# Patient Record
Sex: MALE | Race: BLACK OR AFRICAN AMERICAN | NOT HISPANIC OR LATINO | Employment: OTHER | URBAN - METROPOLITAN AREA
[De-identification: names, ages, dates, MRNs, and addresses within clinical notes are randomized per-mention and may not be internally consistent; named-entity substitution may affect disease eponyms.]

---

## 2017-01-28 ENCOUNTER — HOSPITAL ENCOUNTER (EMERGENCY)
Facility: HOSPITAL | Age: 32
Discharge: HOME | End: 2017-01-28
Attending: EMERGENCY MEDICINE
Payer: OTHER GOVERNMENT

## 2017-01-28 VITALS
TEMPERATURE: 99 F | DIASTOLIC BLOOD PRESSURE: 76 MMHG | SYSTOLIC BLOOD PRESSURE: 126 MMHG | WEIGHT: 190 LBS | OXYGEN SATURATION: 100 % | BODY MASS INDEX: 26.6 KG/M2 | HEART RATE: 82 BPM | HEIGHT: 71 IN | RESPIRATION RATE: 10 BRPM

## 2017-01-28 DIAGNOSIS — Z76.0 MEDICATION REFILL: ICD-10-CM

## 2017-01-28 DIAGNOSIS — B02.29 POSTHERPETIC NEURALGIA: Primary | ICD-10-CM

## 2017-01-28 PROCEDURE — 99282 EMERGENCY DEPT VISIT SF MDM: CPT

## 2017-01-28 RX ORDER — GABAPENTIN 300 MG/1
300 CAPSULE ORAL 3 TIMES DAILY
Qty: 90 CAPSULE | Refills: 0 | Status: SHIPPED | OUTPATIENT
Start: 2017-01-28 | End: 2017-02-17

## 2017-01-28 RX ORDER — VALACYCLOVIR HYDROCHLORIDE 500 MG/1
500 TABLET, FILM COATED ORAL DAILY
Qty: 30 TABLET | Refills: 0 | Status: SHIPPED | OUTPATIENT
Start: 2017-01-28 | End: 2017-02-17

## 2017-01-28 NOTE — ED AVS SNAPSHOT
[facility] [address]  [place] [address]               Junito Tian   2017  2:12 PM   ED    Description:  Male : 1985   Department:  [facility]           Your Care was Coordinated By:     Provider Role From To    Pollo Max MD Attending Provider 17 0245 --    Sakina Porras PA-C Physician Assistant 17 [phone] --      Reason for Visit     Medication Refill           Diagnoses this Visit        Comments    Postherpetic neuralgia    -  Primary     Medication refill           ED Disposition     None           To Do List           Follow-up Information     Follow up with Tawana Spencer MD.    Specialty:  Infectious Diseases    Why:  as scheduled in 2 weeks    Contact information:    [address]  [place] [address] [phone]         These Medications        Disp Refills Start End    valacyclovir (VALTREX) 500 MG tablet 30 tablet 0 2017    Take 1 tablet (500 mg total) by mouth once daily. - Oral    gabapentin (NEURONTIN) 300 MG capsule 90 capsule 0 2017    Take 1 capsule (300 mg total) by mouth 3 (three) times daily. - Oral      [facility]     [facility] -  Assistance  Registered nurses in the [facility] provide clinical advisement, health education, appointment booking, and other advisory services.  Call for this free service at [phone].             Medications           Message regarding Medications     Verify the changes and/or additions to your medication regime listed below are the same as discussed with your clinician today.  If any of these changes or additions are incorrect, please notify your healthcare provider.        START taking these NEW medications        Refills    valacyclovir (VALTREX) 500 MG tablet 0    Sig: Take 1 tablet (500 mg total) by mouth once daily.    Class: Print    Route: Oral    gabapentin  "(NEURONTIN) 300 MG capsule 0    Sig: Take 1 capsule (300 mg total) by mouth 3 (three) times daily.    Class: Print    Route: Oral           Verify that the below list of medications is an accurate representation of the medications you are currently taking.  If none reported, the list may be blank. If incorrect, please contact your healthcare provider. Carry this list with you in case of emergency.           Current Medications     biotin 1 mg tablet Take 1,000 mcg by mouth once daily.    fish oil-omega-3 fatty acids 300-1,000 mg capsule Take 2 g by mouth once daily.    gabapentin (NEURONTIN) 300 MG capsule Take 1 capsule (300 mg total) by mouth 3 (three) times daily.    valacyclovir (VALTREX) 500 MG tablet Take 1 tablet (500 mg total) by mouth once daily.    zinc gluconate 50 mg tablet Take 50 mg by mouth once daily.           Clinical Reference Information           Your Vitals Were     BP Pulse Temp Resp Height Weight    126/76 (BP Location: Right arm, Patient Position: Sitting) 82 99 °F (37.2 °C) (Oral) 10 5' 11" (1.803 m) 86.2 kg (190 lb)    SpO2 BMI             100% 26.5 kg/m2         Allergies as of 1/28/2017     No Known Allergies      Immunizations Administered on Date of Encounter - 1/28/2017     None      ED Micro, Lab, POCT     None      ED Imaging Orders     None      Smoking Cessation     If you would like to quit smoking:   You may be eligible for free services if you are a [place] resident and started smoking cigarettes before September 1, 1988.  Call the Smoking Cessation Trust ([facility]) toll free at (369) 415-3190 or (910) 516-0528.   Call 3-800-QUIT-NOW if you do not meet the above criteria.             [facility] complies with applicable Federal civil rights laws and does not discriminate on the basis of race, color, national origin, age, disability, or sex.        Language Assistance Services     ATTENTION: Language assistance services are available, free of charge. Please " call [phone].      ATENCIÓN: Si habla español, tiene a driscoll disposición servicios gratuitos de asistencia lingüística. Llame al [phone].     CHÚ Ý: N?u b?n nói Ti?ng Vi?t, có các d?ch v? h? tr? ngôn ng? mi?n phí dành cho b?n. G?i s? [phone].

## 2017-01-28 NOTE — ED NOTES
Pt requesting refill of valtrex, denies s/s at this time. Has not followed up with PCP since last ED visit.

## 2017-01-28 NOTE — ED PROVIDER NOTES
Encounter Date: 1/28/2017       History     Chief Complaint   Patient presents with    Medication Refill     valacyclovir 500 mg      Review of patient's allergies indicates:  No Known Allergies  HPI Comments: Junito Tian is a 31 y.o. male presenting for refills of his Valtrex and Neurontin.  He has been taking these medications for the last several weeks since developing postherpetic neuralgia.  His symptoms return, if he doesn't take the medication.  No fever, no chills.  He has an appointment with infectious disease in a couple of weeks.      The history is provided by the patient.     Past Medical History   Diagnosis Date    Achilles tendon rupture      LEFT     Past Medical History Pertinent Negatives   Diagnosis Date Noted    Blood transfusion 7/26/2013     History reviewed. No pertinent past surgical history.  History reviewed. No pertinent family history.  Social History   Substance Use Topics    Smoking status: Current Some Day Smoker    Smokeless tobacco: Never Used      Comment: CIGARS    Alcohol use Yes      Comment: SOCIAL     Review of Systems   Constitutional: Negative for chills and fever.   Musculoskeletal: Negative for arthralgias, back pain, joint swelling, myalgias, neck pain and neck stiffness.   Skin: Negative for color change, pallor, rash and wound.   Neurological: Negative for weakness and numbness.   Hematological: Does not bruise/bleed easily.       Physical Exam   Initial Vitals   BP Pulse Resp Temp SpO2   01/28/17 1409 01/28/17 1409 01/28/17 1409 01/28/17 1409 01/28/17 1409   126/76 82 10 99 °F (37.2 °C) 100 %     Physical Exam    Nursing note and vitals reviewed.  Constitutional: He appears well-developed and well-nourished. He is not diaphoretic. No distress.   HENT:   Head: Normocephalic and atraumatic.   Cardiovascular: Intact distal pulses.   Musculoskeletal: Normal range of motion. He exhibits no edema or tenderness.   Neurological: He is alert and oriented to person, place,  and time. He has normal strength. No sensory deficit.   Skin: Skin is warm and dry. No rash and no abscess noted. No erythema.         ED Course   Procedures  Labs Reviewed - No data to display          Medical Decision Making:   History:   Old Medical Records: I decided to obtain old medical records.  Old Records Summarized: other records.       <> Summary of Records: Recent labs here in the ED were negative.         APC / Resident Notes:   He is given a refill on Valtrex 500 mg daily and Neurontin 300 mg TID.  He will follow-up with infectious disease as scheduled.  He is given specific return precautions.               ED Course     Clinical Impression:   The primary encounter diagnosis was Postherpetic neuralgia. A diagnosis of Medication refill was also pertinent to this visit.          Sakina Porras PA-C  01/28/17 1545

## 2017-02-17 ENCOUNTER — HOSPITAL ENCOUNTER (EMERGENCY)
Facility: HOSPITAL | Age: 32
Discharge: HOME | End: 2017-02-17
Attending: EMERGENCY MEDICINE
Payer: OTHER GOVERNMENT

## 2017-02-17 VITALS
BODY MASS INDEX: 26.5 KG/M2 | OXYGEN SATURATION: 99 % | DIASTOLIC BLOOD PRESSURE: 76 MMHG | TEMPERATURE: 98 F | HEART RATE: 67 BPM | RESPIRATION RATE: 16 BRPM | WEIGHT: 190 LBS | SYSTOLIC BLOOD PRESSURE: 119 MMHG

## 2017-02-17 DIAGNOSIS — J06.9 UPPER RESPIRATORY TRACT INFECTION, UNSPECIFIED TYPE: Primary | ICD-10-CM

## 2017-02-17 LAB
BILIRUB UR QL STRIP: NEGATIVE
CLARITY UR: CLEAR
COLOR UR: YELLOW
GLUCOSE UR QL STRIP: NEGATIVE
HGB UR QL STRIP: NEGATIVE
KETONES UR QL STRIP: NEGATIVE
LEUKOCYTE ESTERASE UR QL STRIP: NEGATIVE
NITRITE UR QL STRIP: NEGATIVE
PH UR STRIP: 6 [PH] (ref 5–8)
PROT UR QL STRIP: NEGATIVE
SP GR UR STRIP: 1.02 (ref 1–1.03)
URN SPEC COLLECT METH UR: NORMAL
UROBILINOGEN UR STRIP-ACNC: NEGATIVE EU/DL

## 2017-02-17 PROCEDURE — 81003 URINALYSIS AUTO W/O SCOPE: CPT

## 2017-02-17 PROCEDURE — 99283 EMERGENCY DEPT VISIT LOW MDM: CPT

## 2017-02-17 RX ORDER — AMOXICILLIN AND CLAVULANATE POTASSIUM 875; 125 MG/1; MG/1
1 TABLET, FILM COATED ORAL 2 TIMES DAILY
Qty: 20 TABLET | Refills: 0 | Status: SHIPPED | OUTPATIENT
Start: 2017-02-17 | End: 2017-02-27

## 2017-02-17 RX ORDER — FLUTICASONE PROPIONATE 50 MCG
1 SPRAY, SUSPENSION (ML) NASAL 2 TIMES DAILY PRN
Qty: 15 G | Refills: 0 | Status: SHIPPED | OUTPATIENT
Start: 2017-02-17 | End: 2017-09-05

## 2017-02-17 NOTE — ED PROVIDER NOTES
Encounter Date: 2/17/2017       History     Chief Complaint   Patient presents with    Headache    Otalgia    Dysuria     Review of patient's allergies indicates:  No Known Allergies  HPI Comments: Patient is a 31 year old male with complaint of headache, nasal congestion and rhinorrhea for six months. Patigogo denied PMH. He reports he was previously diagnosed with shingles when the symptoms first started. He has since been seen by opthalmology who reported resolution of his symptoms. He has seen his primary care provider who has referred him to ID and ENT. He states he saw Dr. Spencer who recommended he take zyrtec daily. He states the zyrtec results in resolution of his symptoms but he does not want to take medications daily. He has an appointment with ENT in one month. He denied any changes in his symptoms or worsening in severity. He just reports constant, mild symptoms. He denied fever, chills, nausea, vomiting, abdominal pain, recent travel or sick contact.     The history is provided by the patient.     Past Medical History   Diagnosis Date    Achilles tendon rupture      LEFT     Past Medical History Pertinent Negatives   Diagnosis Date Noted    Blood transfusion 7/26/2013     History reviewed. No pertinent past surgical history.  History reviewed. No pertinent family history.  Social History   Substance Use Topics    Smoking status: Current Some Day Smoker    Smokeless tobacco: Never Used      Comment: CIGARS    Alcohol use Yes      Comment: SOCIAL     Review of Systems   Constitutional: Negative for chills and fever.   HENT: Positive for congestion, ear pain, rhinorrhea and sinus pressure. Negative for ear discharge and sore throat.    Respiratory: Negative for cough, shortness of breath and wheezing.    Cardiovascular: Negative for chest pain.   Gastrointestinal: Negative for abdominal pain, diarrhea, nausea and vomiting.   Genitourinary: Negative for dysuria and urgency.   Musculoskeletal:  Negative for back pain, neck pain and neck stiffness.   Skin: Negative for rash.   Neurological: Positive for dizziness and headaches. Negative for seizures, syncope and weakness.   Hematological: Does not bruise/bleed easily.       Physical Exam   Initial Vitals   BP Pulse Resp Temp SpO2   02/17/17 1303 02/17/17 1303 02/17/17 1303 02/17/17 1303 02/17/17 1303   119/76 67 16 97.7 °F (36.5 °C) 99 %     Physical Exam    Nursing note and vitals reviewed.  Constitutional: He appears well-developed and well-nourished.   HENT:   Head: Normocephalic and atraumatic.   Right Ear: Tympanic membrane, external ear and ear canal normal.   Left Ear: Tympanic membrane, external ear and ear canal normal.   Nose: Nose normal. Right sinus exhibits no maxillary sinus tenderness and no frontal sinus tenderness. Left sinus exhibits no maxillary sinus tenderness and no frontal sinus tenderness.   Mouth/Throat: Uvula is midline, oropharynx is clear and moist and mucous membranes are normal. No posterior oropharyngeal edema or posterior oropharyngeal erythema.   Eyes: Conjunctivae and lids are normal. Right eye exhibits no discharge. Left eye exhibits no discharge. No scleral icterus.   Neck: Normal range of motion and full passive range of motion without pain. Neck supple.   Cardiovascular: Normal rate, regular rhythm and normal heart sounds. Exam reveals no gallop and no friction rub.    No murmur heard.  Pulmonary/Chest: Effort normal and breath sounds normal. He has no decreased breath sounds. He has no wheezes. He has no rhonchi. He has no rales.   Abdominal: Soft. Normal appearance and bowel sounds are normal. He exhibits no distension. There is no tenderness. There is no rigidity, no rebound, no guarding and no CVA tenderness.   Musculoskeletal: Normal range of motion. He exhibits no tenderness.   Neurological: He is oriented to person, place, and time.   Skin: Skin is warm and dry.         ED Course   Procedures  Labs Reviewed    URINALYSIS             Medical Decision Making:   History:   Old Medical Records: I decided to obtain old medical records.       APC / Resident Notes:   Patient is a 31-year-old male with complaint of chronic upper respiratory symptoms for 6 months.  He states that Zyrtec relieves his symptoms but is concerned about having to take medications daily.  He has seen infectious disease, Dr. abelardo leiva, and is to see ENT next month.  He denied any worsening of his symptoms.  He denied fever.  He is concerned that he needs antibiotics to clear up his possible respiratory infection.  He is well-appearing.  Vital signs are stable.  Physical exam is unremarkable.  Will prescribe Flonase and do a trial of Augmentin.  He is to follow up with ENT as planned. Discussed results with patient. Return precautions given. Patient is to follow up with their primary care provider. Case was discussed with Dr. Rivera who has evaluated the patient and is in agreement with the plan of care. All questions answered.                 ED Course     Clinical Impression:   The encounter diagnosis was Upper respiratory tract infection, unspecified type.    Disposition:   Disposition: Discharged  Condition: Stable       Jennifer Cisneros PA-C  02/17/17 4276

## 2017-02-17 NOTE — ED AVS SNAPSHOT
[facility] [address] [place] [address]               Junito Tian   2017  1:39 PM   ED    Description:  Male : 1985   Department:  [facility]           Your Care was Coordinated By:     Provider Role From To    Robert Rivera MD Attending Provider 17 [phone] --    Jennifer Cisneros PA-C Physician Assistant 17 [phone] --      Reason for Visit     Headache     Otalgia     Dysuria           Diagnoses this Visit        Comments    Upper respiratory tract infection, unspecified type    -  Primary       ED Disposition     ED Disposition Condition Comment    Discharge             To Do List           Follow-up Information     Follow up with Barbara L Morgan Schoen, FNP In 1 week.    Specialty:  Family Medicine    Contact information:    [address] [facility] [id number] [phone]          Schedule an appointment as soon as possible for a visit with CHELE Ross MD.    Specialty:  Otolaryngology    Contact information:    [address]  [facility] [address] [phone]          Follow up with [facility].    Specialty:  Emergency Medicine    Why:  If symptoms worsen    Contact information:    [address] [place] [address] [phone]       These Medications        Disp Refills Start End    fluticasone (FLONASE) 50 mcg/actuation nasal spray 15 g 0 2017     1 spray by Each Nare route 2 (two) times daily as needed for Allergies. - Each Nare    amoxicillin-clavulanate 875-125mg (AUGMENTIN) 875-125 mg per tablet 20 tablet 0 2017    Take 1 tablet by mouth 2 (two) times daily. - Oral      [facility]     [facility] -  Assistance  Registered nurses in the [facility] provide clinical advisement, health education, appointment booking, and other advisory services.  Call for this free service at  [phone].             Medications           Message regarding Medications     Verify the changes and/or additions to your medication regime listed below are the same as discussed with your clinician today.  If any of these changes or additions are incorrect, please notify your healthcare provider.        START taking these NEW medications        Refills    fluticasone (FLONASE) 50 mcg/actuation nasal spray 0    Si spray by Each Nare route 2 (two) times daily as needed for Allergies.    Class: Print    Route: Each Nare    amoxicillin-clavulanate 875-125mg (AUGMENTIN) 875-125 mg per tablet 0    Sig: Take 1 tablet by mouth 2 (two) times daily.    Class: Print    Route: Oral      STOP taking these medications     gabapentin (NEURONTIN) 300 MG capsule Take 1 capsule (300 mg total) by mouth 3 (three) times daily.    valacyclovir (VALTREX) 500 MG tablet Take 1 tablet (500 mg total) by mouth once daily.           Verify that the below list of medications is an accurate representation of the medications you are currently taking.  If none reported, the list may be blank. If incorrect, please contact your healthcare provider. Carry this list with you in case of emergency.           Current Medications     biotin 1 mg tablet Take 1,000 mcg by mouth once daily.    fish oil-omega-3 fatty acids 300-1,000 mg capsule Take 2 g by mouth once daily.    zinc gluconate 50 mg tablet Take 50 mg by mouth once daily.    amoxicillin-clavulanate 875-125mg (AUGMENTIN) 875-125 mg per tablet Take 1 tablet by mouth 2 (two) times daily.    fluticasone (FLONASE) 50 mcg/actuation nasal spray 1 spray by Each Nare route 2 (two) times daily as needed for Allergies.           Clinical Reference Information           Your Vitals Were     BP Pulse Temp Resp Weight SpO2    119/76 (BP Location: Right arm, Patient Position: Sitting) 67 97.7 °F (36.5 °C) (Oral) 16 86.2 kg (190 lb) 99%    BMI                26.5 kg/m2          Allergies as of  2/17/2017     No Known Allergies      Immunizations Administered on Date of Encounter - 2/17/2017     None      ED Micro, Lab, POCT     Start Ordered       Status Ordering Provider    02/17/17 1342 02/17/17 1341  Urinalysis  STAT      Final result       ED Imaging Orders     None      Smoking Cessation     If you would like to quit smoking:   You may be eligible for free services if you are a [place] resident and started smoking cigarettes before September 1, 1988.  Call the Smoking Cessation Trust ([facility]) toll free at ([phone] or [phone] 912-1165.   Call 0-523-QUIT-NOW if you do not meet the above criteria.             [facility] complies with applicable Federal civil rights laws and does not discriminate on the basis of race, color, national origin, age, disability, or sex.        Language Assistance Services     ATTENTION: Language assistance services are available, free of charge. Please call [phone].      ATENCIÓN: Si habla español, tiene a driscoll disposición servicios gratuitos de asistencia lingüística. Llame al [phone].     CHÚ Ý: N?u b?n nói Ti?ng Vi?t, có các d?ch v? h? tr? ngôn ng? mi?n phí dành cho b?n. G?i s? [phone].

## 2017-02-17 NOTE — ED NOTES
Pt reports multiple complaints that have been occurring for a few weeks. He has an appt with ENT in a month but he is concerned that the zyrtec he is taking is not for everyday use and wants another opinion. Pt also reports headache, earache and dysuria.

## 2017-02-17 NOTE — ED NOTES
Upon discharge, patient is AAOx4, no cardiac or respiratory complications. Follow up care and  Medications have been reviewed with patient and has been instructed to return to the ER if needed. Patient verbalized understanding and ambulated to the lobby without difficulty. REI CARCAMO.

## 2017-04-13 ENCOUNTER — INITIAL CONSULT (OUTPATIENT)
Dept: OPHTHALMOLOGY | Facility: CLINIC | Age: 32
End: 2017-04-13
Payer: OTHER GOVERNMENT

## 2017-04-13 DIAGNOSIS — H10.433 CHRONIC FOLLICULAR CONJUNCTIVITIS OF BOTH EYES: ICD-10-CM

## 2017-04-13 PROCEDURE — 99999 PR PBB SHADOW E&M-EST. PATIENT-LVL III: CPT | Mod: PBBFAC,,, | Performed by: OPHTHALMOLOGY

## 2017-04-13 PROCEDURE — 92004 COMPRE OPH EXAM NEW PT 1/>: CPT | Mod: S$PBB,,, | Performed by: OPHTHALMOLOGY

## 2017-04-13 PROCEDURE — 99213 OFFICE O/P EST LOW 20 MIN: CPT | Mod: PBBFAC | Performed by: OPHTHALMOLOGY

## 2017-04-13 NOTE — PROGRESS NOTES
HPI     Referred by Dr. Linares states chronic eye pain associated w/headaches over the past few   months. Pt states no vision problems.   No flashes/floaters.  4 on pain scale(annoying)     I have personally interviewed the patient, reviewed the history and   examined the patient and agree with the technician's exam.    9/18/2016 new sexual partner. 9/20 2016 developed redness, burning in left   eye. Two days later spread to right eye. Was initially diagnosed with   viral infection but medicine given for that made his symptoms worse. He   saw an ophthalmologist who prescribed topical corticosteroids. These   helped but when he stopped the drops his symptoms returned. He still has   the burning pain in the front of his eyes but sometimes it also involves   behind his eyes, in his forehead, and elsewhere on his head. His eyes may   be very sensitive to light.       Last edited by Pravin Luo MD on 4/13/2017  9:51 AM.     ROS     Positive for: Neurological, Musculoskeletal    Negative for: Constitutional, Gastrointestinal, Skin, Genitourinary,   HENT, Endocrine, Cardiovascular, Eyes, Respiratory, Psychiatric,   Allergic/Imm, Heme/Lymph    Last edited by Pravin Luo MD on 4/13/2017  9:37 AM. (History)        Assessment /Plan     For exam results, see Encounter Report.    Chronic follicular conjunctivitis of both eyes      Mr. Tian does not have the classic features on exam but does have a history that might indicate a Chlamydial infection. I will arrange for evaluation by Dr. Garcia. Return to me as requested.

## 2017-04-13 NOTE — MR AVS SNAPSHOT
[facility]  [address]  [facility] [address]  Phone: [phone]  Fax: [phone]                  Junito Tian   2017 9:00 AM   Initial consult    Description:  Male : 1985   Provider:  Pravin Luo MD   Department:  [facility]           Reason for Visit     Eye Pain     Headache           Diagnoses this Visit        Comments    Chronic follicular conjunctivitis of both eyes                To Do List           Goals (5 Years of Data)     None      Follow-Up and Disposition     Return for Dr. Garcia.      [facility]     [facility] -  Assistance  Unless otherwise directed by your provider, please contact [facility], our nurse care line that is available for  assistance.     Registered nurses in the [facility] provide: appointment scheduling, clinical advisement, health education, and other advisory services.  Call: [phone] (toll free)               Medications           Message regarding Medications     Verify the changes and/or additions to your medication regime listed below are the same as discussed with your clinician today.  If any of these changes or additions are incorrect, please notify your healthcare provider.             Verify that the below list of medications is an accurate representation of the medications you are currently taking.  If none reported, the list may be blank. If incorrect, please contact your healthcare provider. Carry this list with you in case of emergency.           Current Medications     biotin 1 mg tablet Take 1,000 mcg by mouth once daily.    fish oil-omega-3 fatty acids 300-1,000 mg capsule Take 2 g by mouth once daily.    fluticasone (FLONASE) 50 mcg/actuation nasal spray 1 spray by Each Nare route 2 (two) times daily as needed for Allergies.    zinc gluconate 50 mg tablet Take 50 mg by mouth once daily.           Clinical Reference Information            Allergies as of 4/13/2017     No Known Allergies      Immunizations Administered on Date of Encounter - 4/13/2017     None      Instructions    Appointment with Dr. Garcia.       Smoking Cessation     If you would like to quit smoking:   You may be eligible for free services if you are a [place] resident and started smoking cigarettes before September 1, 1988.  Call the Smoking Cessation Trust ([facility]) toll free at (148) 600-3902 or (331) 683-0741.   Call 1-800-QUIT-NOW if you do not meet the above criteria.   Contact us via email: [email or web address]   View our website for more information: [email or web address]        Language Assistance Services     ATTENTION: Language assistance services are available, free of charge. Please call [phone].      ATENCIÓN: Si vinnyla matt, tiene a driscoll disposición servicios gratuitos de asistencia lingüística. Llame al [phone].     THOMAS Ý: N?u b?n nói Ti?ng Vi?t, có các d?ch v? h? tr? ngôn ng? mi?n phí dành cho b?n. G?i s? [phone].         [facility] complies with applicable Federal civil rights laws and does not discriminate on the basis of race, color, national origin, age, disability, or sex.

## 2017-04-18 ENCOUNTER — OFFICE VISIT (OUTPATIENT)
Dept: OPHTHALMOLOGY | Facility: CLINIC | Age: 32
End: 2017-04-18
Attending: OPHTHALMOLOGY
Payer: OTHER GOVERNMENT

## 2017-04-18 DIAGNOSIS — H10.432 CHRONIC FOLLICULAR CONJUNCTIVITIS OF LEFT EYE: Primary | ICD-10-CM

## 2017-04-18 PROCEDURE — 99212 OFFICE O/P EST SF 10 MIN: CPT | Mod: PBBFAC | Performed by: OPHTHALMOLOGY

## 2017-04-18 PROCEDURE — 92012 INTRM OPH EXAM EST PATIENT: CPT | Mod: S$PBB,,, | Performed by: OPHTHALMOLOGY

## 2017-04-18 PROCEDURE — 99999 PR PBB SHADOW E&M-EST. PATIENT-LVL II: CPT | Mod: PBBFAC,,, | Performed by: OPHTHALMOLOGY

## 2017-04-18 RX ORDER — AZITHROMYCIN 1 G/1
1 POWDER, FOR SUSPENSION ORAL ONCE
Qty: 1 PACKET | Refills: 0 | Status: SHIPPED | OUTPATIENT
Start: 2017-04-18 | End: 2017-04-18

## 2017-04-18 NOTE — PROGRESS NOTES
HPI     Eye Problem    Additional comments: Both Eyes.            Comments   32 y/o male presents for evaluation of pain OU, headaches, sinus pain x 6   months. Finds when pain is bad vision gets blurry. Relieved with Asprin.     Started with what was thought to be pink eye OS, spread to OD.  Treated   and seen by multiple eye doctors without relief.  Has tried anitbotic eye   drops and steroid with no relief of symptoms.  Finds all of this started   post encounter with new sexual partner.     No Drops x months.        Last edited by Idalia Romero on 4/18/2017  1:48 PM. (History)            Assessment /Plan     For exam results, see Encounter Report.    Chronic follicular conjunctivitis of left eye    Other orders  -     azithromycin (ZITHROMAX) 1 gram Pack; Take 1 g by mouth once.  Dispense: 1 packet; Refill: 0      Cover chlamyida with abx and use tears for symptoms.

## 2017-04-18 NOTE — MR AVS SNAPSHOT
[facility]  [address]  [facility] [address]  Phone: [phone]  Fax: [phone]                  Junito Tian   2017 1:00 PM   Office Visit    Description:  Male : 1985   Provider:  Karan Garcia MD   Department:  [facility]           Reason for Visit     Eye Problem           Diagnoses this Visit        Comments    Chronic follicular conjunctivitis of left eye    -  Primary            To Do List           Goals (5 Years of Data)     None       These Medications        Disp Refills Start End    azithromycin (ZITHROMAX) 1 gram Pack 1 packet 0 2017    Take 1 g by mouth once. - Oral      [facility]     [facility] On Call Nurse Care Line -  Assistance  Unless otherwise directed by your provider, please contact [facility], our nurse care line that is available for  assistance.     Registered nurses in the [facility] On Call Center provide: appointment scheduling, clinical advisement, health education, and other advisory services.  Call: [phone] (toll free)               Medications           Message regarding Medications     Verify the changes and/or additions to your medication regime listed below are the same as discussed with your clinician today.  If any of these changes or additions are incorrect, please notify your healthcare provider.        START taking these NEW medications        Refills    azithromycin (ZITHROMAX) 1 gram Pack 0    Sig: Take 1 g by mouth once.    Class: Print    Route: Oral           Verify that the below list of medications is an accurate representation of the medications you are currently taking.  If none reported, the list may be blank. If incorrect, please contact your healthcare provider. Carry this list with you in case of emergency.           Current Medications     biotin 1 mg tablet Take 1,000 mcg by mouth once daily.    fish oil-omega-3 fatty acids 300-1,000 mg capsule Take 2 g by  mouth once daily.    fluticasone (FLONASE) 50 mcg/actuation nasal spray 1 spray by Each Nare route 2 (two) times daily as needed for Allergies.    zinc gluconate 50 mg tablet Take 50 mg by mouth once daily.    azithromycin (ZITHROMAX) 1 gram Pack Take 1 g by mouth once.           Clinical Reference Information           Allergies as of 4/18/2017     No Known Allergies      Immunizations Administered on Date of Encounter - 4/18/2017     None      Smoking Cessation     If you would like to quit smoking:   You may be eligible for free services if you are a [place] resident and started smoking cigarettes before September 1, 1988.  Call the Smoking Cessation Trust ([facility]) toll free at (417) 349-3335 or (738) 056-7684.   Call 1-800-QUIT-NOW if you do not meet the above criteria.   Contact us via email: [email or web address]   View our website for more information: [email or web address]        Language Assistance Services     ATTENTION: Language assistance services are available, free of charge. Please call [phone].      ATENCIÓN: Si habla español, tiene a driscoll disposición servicios gratuitos de asistencia lingüística. Llame al [phone].     THOMAS Ý: N?u b?n nói Ti?ng Vi?t, có các d?ch v? h? tr? ngôn ng? mi?n phí dành cho b?n. G?i s? [phone].         [facility] complies with applicable Federal civil rights laws and does not discriminate on the basis of race, color, national origin, age, disability, or sex.

## 2017-05-02 ENCOUNTER — TELEPHONE (OUTPATIENT)
Dept: OPHTHALMOLOGY | Facility: CLINIC | Age: 32
End: 2017-05-02

## 2017-05-02 DIAGNOSIS — H57.13 PAIN AROUND BOTH EYES: ICD-10-CM

## 2017-05-02 NOTE — TELEPHONE ENCOUNTER
----- Message from Pura Mckeon MA sent at 5/1/2017 10:55 AM CDT -----  Contact: Pt      ----- Message -----     From: Donna Carr     Sent: 4/28/2017   4:30 PM       To: [facility] Staff    Pt would like to speak to Dr. Luo about getting a referral to see an ENT doctor. He can be reached at [phone]

## 2017-05-02 NOTE — TELEPHONE ENCOUNTER
I called Mr. Tian. He has had pain behind his eyes and over his sinuses. He is concerned that he might have a sinus problem. I suggested that he contact his PCP's office for a referral should he wish to investigate further the possibility of sinus disease.

## 2017-05-09 ENCOUNTER — TELEPHONE (OUTPATIENT)
Dept: OPHTHALMOLOGY | Facility: CLINIC | Age: 32
End: 2017-05-09

## 2017-05-09 ENCOUNTER — OFFICE VISIT (OUTPATIENT)
Dept: OPHTHALMOLOGY | Facility: CLINIC | Age: 32
End: 2017-05-09
Payer: OTHER GOVERNMENT

## 2017-05-09 DIAGNOSIS — H15.103 EPISCLERITIS, BILATERAL: Primary | ICD-10-CM

## 2017-05-09 PROCEDURE — 99999 PR PBB SHADOW E&M-EST. PATIENT-LVL II: CPT | Mod: PBBFAC,,, | Performed by: OPHTHALMOLOGY

## 2017-05-09 PROCEDURE — 92014 COMPRE OPH EXAM EST PT 1/>: CPT | Mod: S$PBB,,, | Performed by: OPHTHALMOLOGY

## 2017-05-09 PROCEDURE — 99212 OFFICE O/P EST SF 10 MIN: CPT | Mod: PBBFAC | Performed by: OPHTHALMOLOGY

## 2017-05-09 NOTE — PROGRESS NOTES
HPI     31 Y/OM referred by his PCP -Dr. Zavala with concerns eye pain OD >OS   since Sunday 05/07/17. PT describes eye pain as a sticking pain and   feeling drainage behind his ear and neck. Pt is unsure wether it is sinus   related vs something else.  Pt eye has also gets teary and blurred vision   OD > OS. stj was told by PCP that his eye pressure was high.  Denies   family history of Glaucoma.            Last edited by ARCENIO Dorsey Jr., MD on 5/9/2017  2:27 PM.     ROS     Positive for: Eyes    Last edited by ARCENIO Dorsey Jr., MD on 5/9/2017  2:27 PM. (History)        Assessment /Plan     For exam results, see Encounter Report.    Episcleritis, bilateral      Educated about Episcleritis. Discussed etiology.  Advised to use Lotemax and/or Advil to help elevate pain.   If symptoms don't improve, consider referral to Ant seg/corneal specialist for continued care; Dr. Love.  Can also consider CT to rule out sinusitis   RTC 2 week with Dr. Love     This service was scribed by Raegan Hardin for, and in the presence of Dr Dorsey who personally performed this service.    Raegan Hardin, COA    Saroj Dorsey MD

## 2017-05-09 NOTE — TELEPHONE ENCOUNTER
Tired to call pt on home and mobile phone number.  Pt unable to be reached and neither number has a voicemail.

## 2017-05-09 NOTE — MR AVS SNAPSHOT
[facility] - Ophthalmology  [address]  [facility] [address]  Phone: [phone]  Fax: [phone]                  Junito Tian   2017 1:15 PM   Office Visit    Description:  Male : 1985   Provider:  ARCENIO Dorsey Jr., MD   Department:  [facility] - Ophthalmology           Reason for Visit     Concerns About Ocular Health           Diagnoses this Visit        Comments    Episcleritis, bilateral    -  Primary            To Do List           Future Appointments        Provider Department Dept Phone    2017 8:50 AM Danni Love MD [facility] Ophthalmology [phone]      Goals (5 Years of Data)     None      [facility]     [facility] On Call Nurse Care Line -  Assistance  Unless otherwise directed by your provider, please contact [facility], our nurse care line that is available for  assistance.     Registered nurses in the [facility] On Call Center provide: appointment scheduling, clinical advisement, health education, and other advisory services.  Call: [phone] (toll free)               Medications           Message regarding Medications     Verify the changes and/or additions to your medication regime listed below are the same as discussed with your clinician today.  If any of these changes or additions are incorrect, please notify your healthcare provider.             Verify that the below list of medications is an accurate representation of the medications you are currently taking.  If none reported, the list may be blank. If incorrect, please contact your healthcare provider. Carry this list with you in case of emergency.           Current Medications     biotin 1 mg tablet Take 1,000 mcg by mouth once daily.    fish oil-omega-3 fatty acids 300-1,000 mg capsule Take 2 g by mouth once daily.    fluticasone (FLONASE) 50 mcg/actuation nasal spray 1 spray by Each Nare route 2 (two) times daily as needed for Allergies.    zinc gluconate 50 mg tablet Take  50 mg by mouth once daily.           Clinical Reference Information           Allergies as of 5/9/2017     No Known Allergies      Immunizations Administered on Date of Encounter - 5/9/2017     None      Smoking Cessation     If you would like to quit smoking:   You may be eligible for free services if you are a [place] resident and started smoking cigarettes before September 1, 1988.  Call the Smoking Cessation Trust ([facility]) toll free at (612) 625-0411 or (003) 756-6012.   Call 1-800-QUIT-NOW if you do not meet the above criteria.   Contact us via email: [email or web address]   View our website for more information: [email or web address]        Language Assistance Services     ATTENTION: Language assistance services are available, free of charge. Please call [phone].      ATENCIÓN: Si habla español, tiene a driscoll disposición servicios gratuitos de asistencia lingüística. Llame al [phone].     CHÚ Ý: N?u b?n nói Ti?ng Vi?t, có các d?ch v? h? tr? ngôn ng? mi?n phí dành cho b?n. G?i s? [phone].         [facility] complies with applicable Federal civil rights laws and does not discriminate on the basis of race, color, national origin, age, disability, or sex.

## 2017-05-14 ENCOUNTER — HOSPITAL ENCOUNTER (EMERGENCY)
Facility: HOSPITAL | Age: 32
Discharge: HOME | End: 2017-05-14
Attending: EMERGENCY MEDICINE
Payer: OTHER GOVERNMENT

## 2017-05-14 VITALS
OXYGEN SATURATION: 99 % | BODY MASS INDEX: 26.6 KG/M2 | HEART RATE: 75 BPM | WEIGHT: 190 LBS | SYSTOLIC BLOOD PRESSURE: 118 MMHG | TEMPERATURE: 98 F | RESPIRATION RATE: 12 BRPM | DIASTOLIC BLOOD PRESSURE: 67 MMHG | HEIGHT: 71 IN

## 2017-05-14 DIAGNOSIS — R51.9 FACIAL PAIN: Primary | ICD-10-CM

## 2017-05-14 PROCEDURE — 99283 EMERGENCY DEPT VISIT LOW MDM: CPT

## 2017-05-14 NOTE — ED PROVIDER NOTES
"Encounter Date: 5/14/2017       History     Chief Complaint   Patient presents with    Headache     seen by opthalmologist last week. Wants head CT.     Review of patient's allergies indicates:  No Known Allergies  HPI Comments: Patient is a 31 year old male with complaint of facial pain and pain behind eyes for a few months. He denied PMH. He reports he has been having pain behind the eye and has been seeing opthalmology. He states he has been given amoxicillin and azithromycin with no improvement in his symptoms. He states they keep telling me "my eyes are fine" but I continue to have the pain. He reports pain to both temples as well. He denied trauma, fever, chills, nausea, vomiting or recent illness.     The history is provided by the patient.     Past Medical History:   Diagnosis Date    Achilles tendon rupture     LEFT     History reviewed. No pertinent surgical history.  History reviewed. No pertinent family history.  Social History   Substance Use Topics    Smoking status: Current Some Day Smoker    Smokeless tobacco: Never Used      Comment: CIGARS    Alcohol use Yes      Comment: SOCIAL     Review of Systems   Constitutional: Negative for chills and fever.   HENT: Negative for congestion and sore throat.    Eyes: Positive for pain. Negative for photophobia, discharge, redness and visual disturbance.   Respiratory: Negative for shortness of breath.    Cardiovascular: Negative for chest pain.   Gastrointestinal: Negative for abdominal pain, diarrhea, nausea and vomiting.   Genitourinary: Negative for dysuria.   Musculoskeletal: Negative for back pain, neck pain and neck stiffness.   Skin: Negative for rash.   Neurological: Negative for dizziness, weakness and light-headedness.   Hematological: Does not bruise/bleed easily.       Physical Exam   Initial Vitals   BP Pulse Resp Temp SpO2   05/14/17 1257 05/14/17 1257 05/14/17 1257 05/14/17 1257 05/14/17 1257   118/67 75 12 97.6 °F (36.4 °C) 99 % " "    Physical Exam    Nursing note and vitals reviewed.  Constitutional: He appears well-developed and well-nourished.   HENT:   Head: Normocephalic and atraumatic.   Right Ear: Tympanic membrane, external ear and ear canal normal.   Left Ear: Tympanic membrane, external ear and ear canal normal.   Nose: Nose normal. No rhinorrhea. Right sinus exhibits no maxillary sinus tenderness and no frontal sinus tenderness. Left sinus exhibits no maxillary sinus tenderness and no frontal sinus tenderness.   Mouth/Throat: Uvula is midline, oropharynx is clear and moist and mucous membranes are normal.   Eyes: Conjunctivae, EOM and lids are normal. Pupils are equal, round, and reactive to light. Right eye exhibits no discharge. Left eye exhibits no discharge. No scleral icterus.   Neck: Normal range of motion and full passive range of motion without pain. Neck supple.   Cardiovascular: Normal rate, regular rhythm and normal heart sounds. Exam reveals no gallop and no friction rub.    No murmur heard.  Pulmonary/Chest: Effort normal and breath sounds normal. He has no wheezes. He has no rhonchi. He has no rales.   Abdominal: Soft. Bowel sounds are normal. He exhibits no distension. There is no tenderness.   Musculoskeletal: Normal range of motion. He exhibits no tenderness.   Neurological: He is oriented to person, place, and time.   Skin: Skin is warm and dry.         ED Course   Procedures  Labs Reviewed - No data to display          Medical Decision Making:   History:   Old Medical Records: I decided to obtain old medical records.  Old Records Summarized: records from clinic visits.  Clinical Tests:   Radiological Study: Ordered and Reviewed       APC / Resident Notes:   This is an emergent evaluation of a 31-year-old male with complaint of "pain behind the eyes".  He states this is been persistent for months for which she is seen ophthalmology and neuro ophthalmology.  He states at his last appointment he was told if his " symptoms do not improve he may benefit from a CT scan.  He presents today for CT scan.  His physical exam is normal.  He has no sinus tenderness. PERRL. CT head and CT sinus was obtained. No acute abnormalities.  He has been instructed to follow with ENT as needed. No indication for antibiotics at this time. Discussed results with patient. Return precautions given. Patient is to follow up with their primary care provider. Case was discussed with Dr. Soriano who has evaluated the patient and is in agreement with the plan of care. All questions answered.            Attending Attestation:     Physician Attestation Statement for NP/PA:   I have conducted a face to face encounter with this patient in addition to the NP/PA, due to Medical Complexity    Other NP/PA Attestation Additions:      Medical Decision Making: I provided a face to face evaluation of this patient.  I discussed the patient's care with Advanced Practice Clinician.  I reviewed their note and agree with the history, physical, assessment, diagnosis, treatment, and discharge plan provided by the Advanced Practice Clinician. My overall impression is retro orbital headaches.  The patient has been instructed to follow up with their physician or the one provided as well as specific return precautions.                    ED Course     Clinical Impression:   The encounter diagnosis was Facial pain.          Jennifer Cisneros PA-C  05/14/17 1811       Corey Soriano MD  05/14/17 2771       Corey Soriano MD  05/14/17 8661

## 2017-05-14 NOTE — ED AVS SNAPSHOT
[facility] [address] [place] [address]               Junito Tian   2017  1:12 PM   ED    Description:  Male : 1985   Department:  [facility]           Your Care was Coordinated By:     Provider Role From To    Corey Soriano MD Attending Provider 17 [id number] --    Jennifer Cisneros PA-C Physician Assistant 17 [phone] --      Reason for Visit     Headache           Diagnoses this Visit        Comments    Facial pain    -  Primary       ED Disposition     ED Disposition Condition Comment    Discharge             To Do List           Follow-up Information     Follow up with Barbara L Morgan Schoen, FNP In 1 week.    Specialty:  Family Medicine    Contact information:    [address] [facility] [id number] [phone]          Schedule an appointment as soon as possible for a visit with Attila Cervantes MD.    Specialty:  Otolaryngology    Contact information:    [address]  [facility] [address] [phone]          Schedule an appointment as soon as possible for a visit with CHELE Ross MD.    Specialty:  Otolaryngology    Contact information:    [address]  [facility] [id number] [phone]          Follow up with [facility].    Specialty:  Emergency Medicine    Why:  As needed    Contact information:    [address] [place] [address] [phone]      [facility]     [facility] Nurse Care Line - 24/ Assistance  Unless otherwise directed by your provider, please contact [facility], our nurse care line that is available for 24/7 assistance.     Registered nurses in the [facility] Center provide: appointment scheduling, clinical advisement, health education, and other advisory services.  Call: [phone] (toll free)               Medications           Message regarding Medications     Verify the changes and/or  "additions to your medication regime listed below are the same as discussed with your clinician today.  If any of these changes or additions are incorrect, please notify your healthcare provider.             Verify that the below list of medications is an accurate representation of the medications you are currently taking.  If none reported, the list may be blank. If incorrect, please contact your healthcare provider. Carry this list with you in case of emergency.           Current Medications     biotin 1 mg tablet Take 1,000 mcg by mouth once daily.    fish oil-omega-3 fatty acids 300-1,000 mg capsule Take 2 g by mouth once daily.    fluticasone (FLONASE) 50 mcg/actuation nasal spray 1 spray by Each Nare route 2 (two) times daily as needed for Allergies.    zinc gluconate 50 mg tablet Take 50 mg by mouth once daily.           Clinical Reference Information           Your Vitals Were     BP Pulse Temp Resp Height Weight    118/67 (BP Location: Right arm, Patient Position: Sitting) 75 97.6 °F (36.4 °C) (Oral) 12 5' 11" (1.803 m) 86.2 kg (190 lb)    SpO2 BMI             99% 26.5 kg/m2         Allergies as of 5/14/2017     No Known Allergies      Immunizations Administered on Date of Encounter - 5/14/2017     None      ED Micro, Lab, POCT     None      ED Imaging Orders     Start Ordered       Status Ordering Provider    05/14/17 1331 05/14/17 1331  CT Head Without Contrast  1 time imaging      Final result     05/14/17 1331 05/14/17 1331  CT Sinuses without Contrast  1 time imaging      Final result       Your Scheduled Appointments     May 26, 2017  8:50 AM CDT   Consult with MD [facility] ([facility] )    [address]  [facility] [address] [phone]              Smoking Cessation     If you would like to quit smoking:   You may be eligible for free services if you are a [place] resident and started smoking cigarettes before September 1, 1988.  Call the " Smoking Cessation Trust ([facility]) toll free at (717) 909-3869 or (606) 337-1645.   Call 1-800-QUIT-NOW if you do not meet the above criteria.   Contact us via email: [email or web address]   View our website for more information: [email or web address]         [facility] complies with applicable Federal civil rights laws and does not discriminate on the basis of race, color, national origin, age, disability, or sex.        Language Assistance Services     ATTENTION: Language assistance services are available, free of charge. Please call [phone].      ATENCIÓN: Si habla español, tiene a driscoll disposición servicios gratuitos de asistencia lingüística. Llame al [phone].     CHÚ Ý: N?u b?n nói Ti?ng Vi?t, có các d?ch v? h? tr? ngôn ng? mi?n phí dành cho b?n. G?i s? [phone].

## 2017-05-26 ENCOUNTER — INITIAL CONSULT (OUTPATIENT)
Dept: OPHTHALMOLOGY | Facility: CLINIC | Age: 32
End: 2017-05-26
Payer: OTHER GOVERNMENT

## 2017-05-26 DIAGNOSIS — H10.432 CHRONIC FOLLICULAR CONJUNCTIVITIS OF LEFT EYE: Primary | ICD-10-CM

## 2017-05-26 PROCEDURE — 99213 OFFICE O/P EST LOW 20 MIN: CPT | Mod: PBBFAC | Performed by: OPHTHALMOLOGY

## 2017-05-26 PROCEDURE — 99999 PR PBB SHADOW E&M-EST. PATIENT-LVL III: CPT | Mod: PBBFAC,,, | Performed by: OPHTHALMOLOGY

## 2017-05-26 PROCEDURE — 92014 COMPRE OPH EXAM EST PT 1/>: CPT | Mod: S$PBB,,, | Performed by: OPHTHALMOLOGY

## 2017-05-26 RX ORDER — AZITHROMYCIN 1 G/1
1 POWDER, FOR SUSPENSION ORAL ONCE
Qty: 1 PACKET | Refills: 0 | Status: SHIPPED | OUTPATIENT
Start: 2017-05-26 | End: 2017-05-26

## 2017-05-26 NOTE — PATIENT INSTRUCTIONS
Sx improved after takingn abx above then pt notes sx returned after being with a sexual partner - recommend taking another course of azithromycin to cover for chlamyida with abx and use tears for symptoms.    Rec sexual partner take course of azithromycin as well.    IOP high / normal :  21 mm Hg both eyes  - healthy ON  - neg fam h/o glc  - observe for now

## 2017-05-26 NOTE — LETTER
May 26, 2017      ARCENIO Dorsey Jr., MD  [address] [facility] [address]           [facility] - Ophthalmology  [address]  [place] [address]  Phone: [phone]  Fax: [phone]          Patient: Junito Tian   MR Number: [id number]   YOB: 1985   Date of Visit: 5/26/2017       Dear Dr. ARCENIO Dorsey Jr.:    Thank you for referring Junito Tian to me for evaluation. Attached you will find relevant portions of my assessment and plan of care.    If you have questions, please do not hesitate to call me. I look forward to following Junito Tian along with you.    Sincerely,    Danni Love MD    Enclosure  CC:  No Recipients    If you would like to receive this communication electronically, please contact [email or web address] or [phone] to request more information on [facility] access.    For providers and/or their staff who would like to refer a patient to [facility], please contact us through our one-stop-shop provider referral line, [facility], at [phone].    If you feel you have received this communication in error or would no longer like to receive these types of communications, please e-mail [email or web address]

## 2017-05-26 NOTE — PROGRESS NOTES
HPI     Referred by Dr Dorsey    Previously seen by karthik / sean.    Pt referred by Dr Dorsey for eval episcleritis OU. Pt states burning OU   and photophobia with really bright lights.  Pt states headache. Pt needs   IOP rechecked for physical.  Pt is not using any eyedrops at this time.     Pt states in the past antibiotic eyedrops have helped his eyes.     Last edited by Danni Love MD on 5/26/2017  9:12 AM. (History)            Assessment /Plan     For exam results, see Encounter Report.    Chronic follicular conjunctivitis of left eye    Other orders  -     azithromycin (ZITHROMAX) 1 gram Pack; Take 1 g by mouth once.  Dispense: 1 packet; Refill: 0      Sx improved after takingn abx above then pt notes sx returned after being with a sexual partner - recommend taking another course of azithromycin to cover for chlamyida with abx and use tears for symptoms.    Rec sexual partner take course of azithromycin as well.    IOP high / normal   - healthy ON  - neg fam h/o glc  - observe for now --> pt concerned about OHTN, will have GS eval with Dr. Zamora next month and continued care.

## 2017-06-15 ENCOUNTER — TELEPHONE (OUTPATIENT)
Dept: OTOLARYNGOLOGY | Facility: CLINIC | Age: 32
End: 2017-06-15

## 2017-06-15 NOTE — TELEPHONE ENCOUNTER
Spoke with pt that states he needs to be seen this week for headaches and neck pain. I explained to the pt that Lisa is an ENT-ear, nose and throat, and she does not treat headaches or neck pain. I referred the pt to Dr. Shannon our headache specialist and also one of her Neurology colleagues for the neck pain. The pt states that his referral is for an ENT not a Neurologist. Pt also states that his Opthalmologist is who told him to see ENT. I explained to the pt that he should call his PCP and request the correct referrals for his headaches/neck pain. Pt verbalized understanding.

## 2017-06-15 NOTE — TELEPHONE ENCOUNTER
----- Message from Precious Wyatt sent at 6/15/2017 12:51 PM CDT -----  Contact: Pt can be reached at [phone]  Pt is calling to request to schedule an appt as soon as possible, for severe head and neck pain and burry vision. Pt is requesting to see an ENT doctor. Please be so kind to contact pt.    Thank you!

## 2017-06-18 ENCOUNTER — HOSPITAL ENCOUNTER (EMERGENCY)
Facility: HOSPITAL | Age: 32
Discharge: HOME | End: 2017-06-18
Attending: EMERGENCY MEDICINE
Payer: OTHER GOVERNMENT

## 2017-06-18 VITALS
RESPIRATION RATE: 16 BRPM | TEMPERATURE: 99 F | OXYGEN SATURATION: 100 % | WEIGHT: 190 LBS | DIASTOLIC BLOOD PRESSURE: 69 MMHG | SYSTOLIC BLOOD PRESSURE: 137 MMHG | HEART RATE: 87 BPM | BODY MASS INDEX: 26.5 KG/M2

## 2017-06-18 DIAGNOSIS — Z20.2 POSSIBLE EXPOSURE TO STD: ICD-10-CM

## 2017-06-18 DIAGNOSIS — R51.9 NONINTRACTABLE HEADACHE, UNSPECIFIED CHRONICITY PATTERN, UNSPECIFIED HEADACHE TYPE: Primary | ICD-10-CM

## 2017-06-18 PROCEDURE — 96372 THER/PROPH/DIAG INJ SC/IM: CPT

## 2017-06-18 PROCEDURE — 25000003 PHARM REV CODE 250: Performed by: PHYSICIAN ASSISTANT

## 2017-06-18 PROCEDURE — 99283 EMERGENCY DEPT VISIT LOW MDM: CPT | Mod: 25

## 2017-06-18 PROCEDURE — 63600175 PHARM REV CODE 636 W HCPCS: Performed by: PHYSICIAN ASSISTANT

## 2017-06-18 RX ORDER — PROPARACAINE HYDROCHLORIDE 5 MG/ML
1 SOLUTION/ DROPS OPHTHALMIC
Status: COMPLETED | OUTPATIENT
Start: 2017-06-18 | End: 2017-06-18

## 2017-06-18 RX ORDER — DOXYCYCLINE 100 MG/1
100 CAPSULE ORAL 2 TIMES DAILY
Qty: 20 CAPSULE | Refills: 0 | Status: SHIPPED | OUTPATIENT
Start: 2017-06-18 | End: 2017-07-14

## 2017-06-18 RX ORDER — CEFTRIAXONE 500 MG/1
250 INJECTION, POWDER, FOR SOLUTION INTRAMUSCULAR; INTRAVENOUS
Status: DISCONTINUED | OUTPATIENT
Start: 2017-06-18 | End: 2017-06-18 | Stop reason: HOSPADM

## 2017-06-18 RX ADMIN — CEFTRIAXONE SODIUM 250 MG: 500 INJECTION, POWDER, FOR SOLUTION INTRAMUSCULAR; INTRAVENOUS at 02:06

## 2017-06-18 RX ADMIN — FLUORESCEIN SODIUM 1 STRIP: 1 STRIP OPHTHALMIC at 01:06

## 2017-06-18 RX ADMIN — PROPARACAINE HYDROCHLORIDE 1 DROP: 5 SOLUTION/ DROPS OPHTHALMIC at 01:06

## 2017-06-18 NOTE — ED NOTES
Tolerated IM injection Given written and verbal DC instructions questions answered per MD aware to follow up with PCP encouraged to return if needed. Given RX with teaching.

## 2017-06-19 NOTE — ED PROVIDER NOTES
Encounter Date: 6/18/2017       History     Chief Complaint   Patient presents with    Eye Problem     reports having blurred vision to left eye. reports it's been going on for a few months     Review of patient's allergies indicates:  No Known Allergies  Junito Tian is a 31 y.o. Male presenting for evaluation of left sided headache, located across his forehead and around his left eye.  He denies any pain to his eyeball.  No redness, discharge or pain in the eye itself.  He has noticed no visual changes, but does have photophobia.  No nausea.  Occasional neck pain, but no neck pain today.  No fever, no chills.  He has had problems in this area previously, occurring intermittently, since September of 2016.  He has been evaluated here in the ED multiple times and has seen several Ophthalmologists.  No one seems to find any specific reason for the persistent headaches and eye pain.  He has not seen a neurologist.  He is worried that he may have chlamydia or gonorrhea that is contributing to his symptoms.  He feels as if these problems began after a specific sexual encounter last September.  He would like to be treated for those STDs today.  No penile discharge, rash, abdominal pain, dysuria.  No joint pain.       The history is provided by the patient.     Past Medical History:   Diagnosis Date    Achilles tendon rupture     LEFT     History reviewed. No pertinent surgical history.  Family History   Problem Relation Age of Onset    Blindness Neg Hx     Glaucoma Neg Hx     Macular degeneration Neg Hx     Retinal detachment Neg Hx      Social History   Substance Use Topics    Smoking status: Current Some Day Smoker    Smokeless tobacco: Never Used      Comment: CIGARS    Alcohol use Yes      Comment: SOCIAL     Review of Systems   Constitutional: Negative for chills and fever.   HENT: Negative for congestion, facial swelling, rhinorrhea, sinus pressure, sore throat and trouble swallowing.    Eyes: Positive for  photophobia. Negative for pain, discharge, redness, itching and visual disturbance.   Respiratory: Negative for cough, chest tightness, shortness of breath and wheezing.    Cardiovascular: Negative for chest pain and palpitations.   Gastrointestinal: Negative for abdominal pain, diarrhea, nausea and vomiting.   Genitourinary: Negative for discharge and dysuria.   Musculoskeletal: Negative for arthralgias, back pain, joint swelling, myalgias, neck pain and neck stiffness.   Skin: Negative for color change, pallor, rash and wound.   Neurological: Positive for headaches. Negative for dizziness, syncope, weakness, light-headedness and numbness.   Hematological: Does not bruise/bleed easily.       Physical Exam     Initial Vitals [06/18/17 1306]   BP Pulse Resp Temp SpO2   137/69 87 16 98.5 °F (36.9 °C) 100 %     Physical Exam    Nursing note and vitals reviewed.  Constitutional: He appears well-developed and well-nourished. He is not diaphoretic. No distress.   HENT:   Head: Normocephalic and atraumatic.   Right Ear: External ear normal.   Left Ear: External ear normal.   Nose: Nose normal.   Mouth/Throat: Oropharynx is clear and moist.   Eyes: Conjunctivae and EOM are normal. Pupils are equal, round, and reactive to light.   No papilledema   Neck: Normal range of motion. Neck supple.   Cardiovascular: Normal rate, regular rhythm, normal heart sounds and intact distal pulses.   Pulmonary/Chest: Breath sounds normal. No respiratory distress. He has no wheezes. He has no rhonchi. He has no rales.   Abdominal: Soft. He exhibits no distension and no mass. There is no tenderness.   Musculoskeletal: Normal range of motion. He exhibits no edema or tenderness.   Neurological: He is alert and oriented to person, place, and time. He has normal strength. No sensory deficit.   No focal neurological deficits noted today.  Cranial nerves III-XII intact.  Equal, bilateral rapid alternating movements noted.    Skin: Skin is warm and  dry. No rash and no abscess noted. No erythema.   Psychiatric: He has a normal mood and affect.         ED Course   Procedures  Labs Reviewed - No data to display          Medical Decision Making:   History:   Old Medical Records: I decided to obtain old medical records.  Old Records Summarized: records from clinic visits and records from previous admission(s).       <> Summary of Records: Negative head and maxillofacial CT.    Differential Diagnosis:   Cluster headache  Ocular migraine  STD         APC / Resident Notes:   He is treated for possible Chlamydia and Gonorrhea with IM Rocephin and PO Doxy.  We don't feel any further imaging or testing is indicated today.  His symptoms may be related to a type of migraine headache.  He will be discharged home to follow-up with neurology for re-evaluation and further treatment options.  He voices understanding and is agreeable to the plan.  He is given specific return precautions.          Attending Attestation:     Physician Attestation Statement for NP/PA:   I have conducted a face to face encounter with this patient in addition to the NP/PA, due to Medical Complexity    Other NP/PA Attestation Additions:      Medical Decision Making: I provided a face to face evaluation of this patient.  I discussed the patient's care with Advanced Practice Clinician.  I reviewed their note and agree with the history, physical, assessment, diagnosis, treatment, and discharge plan provided by the Advanced Practice Clinician. My overall impression is chronic retro-orbital HA.  The patient has been instructed to follow up with their physician or the one provided as well as specific return precautions.   Patient has an unremarkable physical exam without papilledema or neck stiffness or any cranial nerve palsy.  Patient has no numbness or weakness of the extremities.  I do not think this represents a deep space abscess or meningitis or subarachnoid or dissection of the vertebral or carotid  arteries.  I do not think this represents cervical vertebral discitis or osteomyelitis or carbon monoxide poisoning or subarachnoid.  Patient does need to see neurology for these chronic retro-orbital headaches.  I do not believe he needs any more imaging such as an MRI and I see no benefit at this time from a lumbar puncture.  I do not believe this represents idiopathic intracranial hypertension.                 ED Course     Clinical Impression:   The primary encounter diagnosis was Nonintractable headache, unspecified chronicity pattern, unspecified headache type. A diagnosis of Possible exposure to STD was also pertinent to this visit.          Sakina Porras PA-C  06/18/17 5129       Corey Soriano MD  06/19/17 0564

## 2017-07-14 ENCOUNTER — OFFICE VISIT (OUTPATIENT)
Dept: OPHTHALMOLOGY | Facility: CLINIC | Age: 32
End: 2017-07-14
Payer: OTHER GOVERNMENT

## 2017-07-14 DIAGNOSIS — H10.432 CHRONIC FOLLICULAR CONJUNCTIVITIS OF LEFT EYE: Primary | ICD-10-CM

## 2017-07-14 PROCEDURE — 99999 PR PBB SHADOW E&M-EST. PATIENT-LVL II: CPT | Mod: PBBFAC,,, | Performed by: OPHTHALMOLOGY

## 2017-07-14 PROCEDURE — 92014 COMPRE OPH EXAM EST PT 1/>: CPT | Mod: 25,S$PBB,, | Performed by: OPHTHALMOLOGY

## 2017-07-14 PROCEDURE — 65430 CORNEAL SMEAR: CPT | Mod: S$PBB,LT,, | Performed by: OPHTHALMOLOGY

## 2017-07-14 PROCEDURE — 65430 CORNEAL SMEAR: CPT | Mod: PBBFAC | Performed by: OPHTHALMOLOGY

## 2017-07-14 PROCEDURE — 87070 CULTURE OTHR SPECIMN AEROBIC: CPT

## 2017-07-14 PROCEDURE — 99212 OFFICE O/P EST SF 10 MIN: CPT | Mod: PBBFAC,25 | Performed by: OPHTHALMOLOGY

## 2017-07-14 PROCEDURE — 87081 CULTURE SCREEN ONLY: CPT

## 2017-07-14 PROCEDURE — 87102 FUNGUS ISOLATION CULTURE: CPT

## 2017-07-14 PROCEDURE — 87110 CHLAMYDIA CULTURE: CPT

## 2017-07-14 NOTE — PROGRESS NOTES
HPI     Previously saw sean / karthik    Chronic follicular conj - tx'ed in past with: azithromycin, doxy, valtrex   // lotemax, viroptic, tobradex    Patient states OU vision constant pain. Dizzy, blurry vision, and nausea.   5 on pain scale     Eye drops:Systane daily OU    Last edited by Danni Love MD on 7/14/2017 11:58 AM. (History)            Assessment /Plan     For exam results, see Encounter Report.    Chronic follicular conjunctivitis of left eye  -     Chlamydia trachomatis culture  -     Aerobic culture  -     Fungus culture  -     CULTURE, GONOCOCCUS      Chronic follicular conj OU   - tx'ed in past with: azithromycin, doxy, valtrex // lotemax, viroptic, tobradex    cx today as above, will call next wk with results.

## 2017-07-17 LAB
BACTERIA GENITAL AEROBE CULT: NORMAL
BACTERIA SPEC AEROBE CULT: NO GROWTH

## 2017-07-20 ENCOUNTER — TELEPHONE (OUTPATIENT)
Dept: OPHTHALMOLOGY | Facility: CLINIC | Age: 32
End: 2017-07-20

## 2017-07-20 NOTE — TELEPHONE ENCOUNTER
Spoke to pt and advised, per Dr Love, that so far the gonorrhea cx is negative and the bacterial culture is negative as well, still waiting for the chlamydia and the fungal cx.  Advised pt we will keep him posted. Pt understood.

## 2017-07-20 NOTE — TELEPHONE ENCOUNTER
----- Message from Stiven Shannon sent at 7/20/2017  4:13 PM CDT -----  Contact: [phone]  Pt called to discuss results of last appt,please call back at [phone],thanks

## 2017-07-21 ENCOUNTER — PATIENT MESSAGE (OUTPATIENT)
Dept: OPHTHALMOLOGY | Facility: CLINIC | Age: 32
End: 2017-07-21

## 2017-07-21 LAB
C TRACH SPEC QL CULT: NORMAL
CHLAMYDIA TRACHOMATIS SOURCE: NORMAL

## 2017-07-25 ENCOUNTER — TELEPHONE (OUTPATIENT)
Dept: OPHTHALMOLOGY | Facility: CLINIC | Age: 32
End: 2017-07-25

## 2017-07-25 NOTE — TELEPHONE ENCOUNTER
Spoke to pt and advised, per Dr Love, that all cultures came back negative / no growth.  Pt is still having problems with his eyes so the next step, per Dr Love, may be to see Dr Booth for small tissue biopsy to see if that helps determine diagnosis. Pt would like to have an appointment with Dr Booth.  Advised pt that Dr Booth's technicians will call him to set up that appointment. Pt understood.

## 2017-07-25 NOTE — TELEPHONE ENCOUNTER
----- Message from Suyapa Reyes MA sent at 7/25/2017  3:04 PM CDT -----  Please call pt to set up an appointment with Dr Booth per Dr Love.      ----- Message -----  From: Danni Love MD  Sent: 7/25/2017   9:49 AM  To: Kate [facility] Staff    pls call pt and let him know that all cx came back negative / no growth.      If he's still having problems with his eyes - next step may be to see artie for small tissue biopsy to see if that helps determine diagnosis.    Thanks.

## 2017-08-08 ENCOUNTER — TELEPHONE (OUTPATIENT)
Dept: OPHTHALMOLOGY | Facility: CLINIC | Age: 32
End: 2017-08-08

## 2017-08-08 NOTE — TELEPHONE ENCOUNTER
----- Message from Suyapa Reyes MA sent at 8/8/2017  1:22 PM CDT -----  Contact: patient  Pt returned the call.  Please call pt to schedule an appt with Dr Booth.      Thanks, Suyapa     ----- Message -----  From: Tania Aviles  Sent: 8/8/2017   9:31 AM  To: [facility] Staff    patientneeds to speak with the nurse to find who Dr Love was going to refer him to as she mentioned something during his visit 7/14/17.  Patient's # is [phone]

## 2017-08-14 ENCOUNTER — PATIENT MESSAGE (OUTPATIENT)
Dept: OPHTHALMOLOGY | Facility: CLINIC | Age: 32
End: 2017-08-14

## 2017-08-14 LAB — FUNGUS SPEC CULT: NORMAL

## 2017-08-18 ENCOUNTER — TELEPHONE (OUTPATIENT)
Dept: OPTOMETRY | Facility: CLINIC | Age: 32
End: 2017-08-18

## 2017-09-05 ENCOUNTER — OFFICE VISIT (OUTPATIENT)
Dept: NEUROLOGY | Facility: CLINIC | Age: 32
End: 2017-09-05
Payer: OTHER GOVERNMENT

## 2017-09-05 VITALS
HEART RATE: 71 BPM | SYSTOLIC BLOOD PRESSURE: 121 MMHG | WEIGHT: 190.5 LBS | HEIGHT: 71 IN | BODY MASS INDEX: 26.67 KG/M2 | DIASTOLIC BLOOD PRESSURE: 85 MMHG

## 2017-09-05 DIAGNOSIS — R51.9 CHRONIC NONINTRACTABLE HEADACHE, UNSPECIFIED HEADACHE TYPE: Primary | ICD-10-CM

## 2017-09-05 DIAGNOSIS — G89.29 CHRONIC NONINTRACTABLE HEADACHE, UNSPECIFIED HEADACHE TYPE: Primary | ICD-10-CM

## 2017-09-05 PROCEDURE — 99999 PR PBB SHADOW E&M-EST. PATIENT-LVL III: CPT | Mod: PBBFAC,,, | Performed by: PSYCHIATRY & NEUROLOGY

## 2017-09-05 PROCEDURE — 99205 OFFICE O/P NEW HI 60 MIN: CPT | Mod: S$PBB,,, | Performed by: PSYCHIATRY & NEUROLOGY

## 2017-09-05 PROCEDURE — 3008F BODY MASS INDEX DOCD: CPT | Mod: ,,, | Performed by: PSYCHIATRY & NEUROLOGY

## 2017-09-05 PROCEDURE — 99213 OFFICE O/P EST LOW 20 MIN: CPT | Mod: PBBFAC | Performed by: PSYCHIATRY & NEUROLOGY

## 2017-09-05 NOTE — LETTER
September 7, 2017      Donald Suárez MD  [address] [facility] [address]           [facility]  [address]  [place] [address]  Phone: [phone]  Fax: [phone]          Patient: Junito Tian   MR Number: [id number]   YOB: 1985   Date of Visit: 9/5/2017       Dear Dr. Donald Suárez:    Thank you for referring Junito Tian to me for evaluation. Attached you will find relevant portions of my assessment and plan of care.    If you have questions, please do not hesitate to call me. I look forward to following Junito Tian along with you.    Sincerely,    Espinoza Whitaker MD    Enclosure  CC:  No Recipients    If you would like to receive this communication electronically, please contact [email or web address] or [phone] to request more information on [employer] Link access.    For providers and/or their staff who would like to refer a patient to [facility], please contact us through our one-stop-shop provider referral line, [facility] , at [phone].    If you feel you have received this communication in error or would no longer like to receive these types of communications, please e-mail [email or web address]

## 2017-09-07 NOTE — PROGRESS NOTES
[facility] - NEUROLOGY  [facility] [place]    Date: September 7, 2017   Patient Name: Junito Tian   MRN: [id number]   PCP: Barbara L Morgan Schoen  Referring Provider: Donald Suárez MD    Assessment:      This is Junito Tian, 31 y.o. male with several months of mild constant headache with head imaging showing no intracranial pathology or sinus disease, reports TSH from PCP was normal.  I would like to screen for metabolic etiologies.     Plan:      -  Labs today  -  Begin Mg Supplementation    Follow up if symptoms do not improve.       I discussed side effects of the medications. I asked the patient to stop the medication if he notices serious adverse effects as we discussed and to seek immediate medical attention at an ER.     Espinoza Whitaker MD  [facility]   Department of Neurology    Subjective:      HPI:   Mr. Junito Tian is a 31 y.o. male who presents with a chief complaint of headaches    For the past 6-7 months he has had mild bifrontal headache without associated nausea, photo/phonophobia, vision changes, or autonomic features.  There was no inciting trauma and he endorses good sleep hygiene and diet as well as adequate hydration although he does have some difficulty with constipation.  He does not take any supplements and does not have any recent stressors.  He notes he had a new sexual partner around onset of symptoms but has had STD testing.    PAST MEDICAL HISTORY:  Past Medical History:   Diagnosis Date    Achilles tendon rupture     LEFT       PAST SURGICAL HISTORY:  History reviewed. No pertinent surgical history.    CURRENT MEDS:  Current Outpatient Prescriptions   Medication Sig Dispense Refill    biotin 1 mg tablet Take 1,000 mcg by mouth once daily.      fish oil-omega-3 fatty acids 300-1,000 mg capsule Take 2 g by mouth once daily.      zinc gluconate 50 mg tablet Take 50 mg by mouth once daily.       No current  "facility-administered medications for this visit.        ALLERGIES:  Review of patient's allergies indicates:  No Known Allergies    FAMILY HISTORY:  Family History   Problem Relation Age of Onset    Blindness Neg Hx     Glaucoma Neg Hx     Macular degeneration Neg Hx     Retinal detachment Neg Hx        SOCIAL HISTORY:  Social History   Substance Use Topics    Smoking status: Current Some Day Smoker    Smokeless tobacco: Never Used      Comment: CIGARS    Alcohol use Yes      Comment: SOCIAL       Review of Systems:  12 review of systems is negative except for the symptoms mentioned in HPI.        Objective:     Vitals:    09/05/17 1437   BP: 121/85   Pulse: 71   Weight: 86.4 kg (190 lb 7.6 oz)   Height: 5' 11" (1.803 m)       General: NAD, well nourished   Eyes: no tearing, discharge, no erythema   ENT: moist mucous membranes of the oral cavity, nares patent    Neck: Supple, full range of motion  Cardiovascular: Warm and well perfused, pulses equal and symmetrical  Lungs: Normal work of breathing, normal chest wall excursions  Skin: No rash, lesions, or breakdown on exposed skin  Psychiatry: Mood and affect are appropriate   Abdomen: soft, non tender, non distended  Extremeties: No cyanosis, clubbing or edema.    Neurological   MENTAL STATUS: Alert and oriented to person, place, and time. Attention and concentration within normal limits. Speech without dysarthria, able to name and repeat without difficulty. Recent and remote memory within normal limits   CRANIAL NERVES: Visual fields intact. PERRL. EOMI. Facial sensation intact. Face symmetrical. Hearing grossly intact. Full shoulder shrug bilaterally. Tongue protrudes midline   SENSORY: Sensation is intact to light touch throughout.  Negative Romberg.   MOTOR: Normal bulk and tone. No pronator drift.  5/5 deltoid, biceps, triceps, interosseous, hand  bilaterally. 5/5 iliopsoas, knee extension/flexion, foot dorsi/plantarflexion bilaterally.    REFLEXES: " Symmetric and 2+ throughout. Toes down going bilaterally.   CEREBELLAR/COORDINATION/GAIT: Gait steady with normal arm swing and stride length.  Heel to shin intact. Finger to nose intact. Normal rapid alternating movements.

## 2017-10-25 ENCOUNTER — OFFICE VISIT (OUTPATIENT)
Dept: NEUROLOGY | Facility: CLINIC | Age: 32
End: 2017-10-25
Payer: OTHER GOVERNMENT

## 2017-10-25 VITALS
HEART RATE: 78 BPM | DIASTOLIC BLOOD PRESSURE: 83 MMHG | HEIGHT: 71 IN | WEIGHT: 189.31 LBS | SYSTOLIC BLOOD PRESSURE: 124 MMHG | BODY MASS INDEX: 26.5 KG/M2

## 2017-10-25 DIAGNOSIS — G44.89 OTHER HEADACHE SYNDROME: ICD-10-CM

## 2017-10-25 DIAGNOSIS — B27.90 EBV INFECTION: Primary | ICD-10-CM

## 2017-10-25 PROCEDURE — 99213 OFFICE O/P EST LOW 20 MIN: CPT | Mod: S$PBB,,, | Performed by: PSYCHIATRY & NEUROLOGY

## 2017-10-25 PROCEDURE — 99213 OFFICE O/P EST LOW 20 MIN: CPT | Mod: PBBFAC | Performed by: PSYCHIATRY & NEUROLOGY

## 2017-10-25 PROCEDURE — 99999 PR PBB SHADOW E&M-EST. PATIENT-LVL III: CPT | Mod: PBBFAC,,, | Performed by: PSYCHIATRY & NEUROLOGY

## 2017-10-25 RX ORDER — AMITRIPTYLINE HYDROCHLORIDE 25 MG/1
25 TABLET, FILM COATED ORAL NIGHTLY
Qty: 30 TABLET | Refills: 5 | Status: SHIPPED | OUTPATIENT
Start: 2017-10-25 | End: 2018-09-25

## 2017-10-25 NOTE — PROGRESS NOTES
[facility] - NEUROLOGY  [facility] [place]    Date: October 25, 2017   Patient Name: Junito Tian   MRN: [id number]   PCP: Barbara L Morgan Schoen  Referring Provider: Self, Aaareferral    Assessment:      This is Junito Tian, 31 y.o. male with a year of mild constant headache with head imaging showing no intracranial pathology or sinus disease and serologies consistent with EBV exposure.     Plan:      -  Elavil 25mg qhs  -  Referral to ID  -  Advised to begin vitamin D and B-complex supplement    Follow up 3 months.       I discussed side effects of the medications. I asked the patient to stop the medication if he notices serious adverse effects as we discussed and to seek immediate medical attention at an ER.     Espinoza Whitaker MD  [facility]   Department of Neurology    Subjective:   Headache unchanged, today notes that headache started around the same time as recurrent episcleritis which improved with either valtrex or zithromax drops    HPI 9/2016:   Mr. Junito Tian is a 31 y.o. male who presents with a chief complaint of headaches    For the past 6-7 months he has had mild bifrontal headache without associated nausea, photo/phonophobia, vision changes, or autonomic features.  There was no inciting trauma and he endorses good sleep hygiene and diet as well as adequate hydration although he does have some difficulty with constipation.  He does not take any supplements and does not have any recent stressors.  He notes he had a new sexual partner around onset of symptoms but has had STD testing.    PAST MEDICAL HISTORY:  Past Medical History:   Diagnosis Date    Achilles tendon rupture     LEFT       PAST SURGICAL HISTORY:  No past surgical history on file.    CURRENT MEDS:  Current Outpatient Prescriptions   Medication Sig Dispense Refill    biotin 1 mg tablet Take 1,000 mcg by mouth once daily.      fish oil-omega-3 fatty acids 300-1,000 mg  "capsule Take 2 g by mouth once daily.      zinc gluconate 50 mg tablet Take 50 mg by mouth once daily.      amitriptyline (ELAVIL) 25 MG tablet Take 1 tablet (25 mg total) by mouth every evening. 30 tablet 5     No current facility-administered medications for this visit.        ALLERGIES:  Review of patient's allergies indicates:  No Known Allergies    FAMILY HISTORY:  Family History   Problem Relation Age of Onset    Blindness Neg Hx     Glaucoma Neg Hx     Macular degeneration Neg Hx     Retinal detachment Neg Hx        SOCIAL HISTORY:  Social History   Substance Use Topics    Smoking status: Current Some Day Smoker    Smokeless tobacco: Never Used      Comment: CIGARS    Alcohol use Yes      Comment: SOCIAL       Review of Systems:  12 review of systems is negative except for the symptoms mentioned in HPI.        Objective:     Vitals:    10/25/17 1055   BP: 124/83   Pulse: 78   Weight: 85.9 kg (189 lb 4.8 oz)   Height: 5' 11" (1.803 m)       General: NAD, well nourished   Eyes: no tearing, discharge, no erythema   ENT: moist mucous membranes of the oral cavity, nares patent    Neck: Supple, full range of motion  Cardiovascular: Warm and well perfused, pulses equal and symmetrical  Lungs: Normal work of breathing, normal chest wall excursions  Skin: No rash, lesions, or breakdown on exposed skin  Psychiatry: Mood and affect are appropriate   Abdomen: soft, non tender, non distended  Extremeties: No cyanosis, clubbing or edema.    Neurological   MENTAL STATUS: Alert and oriented to person, place, and time. Attention and concentration within normal limits. Speech without dysarthria, able to name and repeat without difficulty. Recent and remote memory within normal limits   CRANIAL NERVES: Visual fields intact. PERRL. EOMI. Facial sensation intact. Face symmetrical. Hearing grossly intact. Full shoulder shrug bilaterally. Tongue protrudes midline   MOTOR: Normal bulk and tone. No pronator drift.  5/5 " deltoid, biceps, triceps, interosseous, hand  bilaterally. 5/5 iliopsoas, knee extension/flexion, foot dorsi/plantarflexion bilaterally.    CEREBELLAR/COORDINATION/GAIT: Gait steady with normal arm swing and stride length.  Heel to shin intact. Finger to nose intact. Normal rapid alternating movements.

## 2017-11-20 ENCOUNTER — INITIAL CONSULT (OUTPATIENT)
Dept: OPHTHALMOLOGY | Facility: CLINIC | Age: 32
End: 2017-11-20
Payer: OTHER GOVERNMENT

## 2017-11-20 DIAGNOSIS — H02.534 EYELID RETRACTION LEFT UPPER EYELID: Primary | ICD-10-CM

## 2017-11-20 DIAGNOSIS — H57.13 PAIN AROUND BOTH EYES: ICD-10-CM

## 2017-11-20 PROCEDURE — 99212 OFFICE O/P EST SF 10 MIN: CPT | Mod: PBBFAC | Performed by: OPHTHALMOLOGY

## 2017-11-20 PROCEDURE — 99999 PR PBB SHADOW E&M-EST. PATIENT-LVL II: CPT | Mod: PBBFAC,,, | Performed by: OPHTHALMOLOGY

## 2017-11-20 PROCEDURE — 92285 EXTERNAL OCULAR PHOTOGRAPHY: CPT | Mod: PBBFAC | Performed by: OPHTHALMOLOGY

## 2017-11-20 PROCEDURE — 92014 COMPRE OPH EXAM EST PT 1/>: CPT | Mod: S$PBB,,, | Performed by: OPHTHALMOLOGY

## 2017-11-20 NOTE — PROGRESS NOTES
HPI     Mr. Wild is here today for a consult. He states that he had an eye   infection that never really went away, and he did not get treated for it.   He states that both eyes bother him but OS>OD his vision is blurred and   his eyes burn and become watery. He denies any eye pain, but he states   that it does feel swollen. He is not using any eye medications.     Last edited by Staci Booth MD on 11/20/2017  4:02 PM. (History)            Assessment /Plan     For exam results, see Encounter Report.    Eyelid retraction left upper eyelid  -     External Photography - OU - Both Eyes  -     X-Ray Chest PA And Lateral; Future; Expected date: 11/20/2017  -     Thyroid stimulating immunoglobulin; Future; Expected date: 11/20/2017  -     ANTI -SSA ANTIBODY; Future; Expected date: 12/04/2017  -     ANTI-SSB ANTIBODY; Future; Expected date: 12/04/2017    Pain around both eyes      Concern for euthyroid grave's disease. Patient's mother with thyroid disease. Also with recent + EBV antibodies. No corneal stromal opacification on exam today.     CT Maxillofacial reviewed: No gross signs of extraocular muscle enlargement.     Check TSI, and Sjogren's and c-xray for sarcoidosis.    Return in 2-3 weeks. Recommend aggressive lubrication with artificial tears up to qid.

## 2017-11-20 NOTE — LETTER
November 20, 2017      Danni Love MD  [address]  [place] [address]           [facility] - Ophthalmology  [address]  [place] [address]  Phone: [phone]  Fax: [phone]          Patient: Junito Tian   MR Number: [id number]   YOB: 1985   Date of Visit: 11/20/2017       Dear Dr. Danni Love:    Thank you for referring Junito Tian to me for evaluation. Attached you will find relevant portions of my assessment and plan of care.    If you have questions, please do not hesitate to call me. I look forward to following Junito Tian along with you.    Sincerely,    Staci Booth MD    Enclosure  CC:  No Recipients    If you would like to receive this communication electronically, please contact [email or web address] or [phone] to request more information on [employer] Link access.    For providers and/or their staff who would like to refer a patient to [facility], please contact us through our one-stop-shop provider referral line, [facility], at [phone].    If you feel you have received this communication in error or would no longer like to receive these types of communications, please e-mail [email or web address]

## 2017-12-01 ENCOUNTER — HOSPITAL ENCOUNTER (OUTPATIENT)
Dept: RADIOLOGY | Facility: HOSPITAL | Age: 32
Discharge: HOME | End: 2017-12-01
Attending: OPHTHALMOLOGY
Payer: OTHER GOVERNMENT

## 2017-12-01 DIAGNOSIS — H02.534 EYELID RETRACTION LEFT UPPER EYELID: ICD-10-CM

## 2017-12-01 PROCEDURE — 71020 XR CHEST PA AND LATERAL: CPT | Mod: 26,,, | Performed by: RADIOLOGY

## 2017-12-01 PROCEDURE — 71020 XR CHEST PA AND LATERAL: CPT | Mod: TC

## 2017-12-14 ENCOUNTER — OFFICE VISIT (OUTPATIENT)
Dept: OPHTHALMOLOGY | Facility: CLINIC | Age: 32
End: 2017-12-14
Payer: OTHER GOVERNMENT

## 2017-12-14 DIAGNOSIS — H57.13 PAIN AROUND BOTH EYES: ICD-10-CM

## 2017-12-14 DIAGNOSIS — H10.432 CHRONIC FOLLICULAR CONJUNCTIVITIS OF LEFT EYE: ICD-10-CM

## 2017-12-14 DIAGNOSIS — H15.109 EPISCLERITIS, UNSPECIFIED LATERALITY: Primary | ICD-10-CM

## 2017-12-14 PROCEDURE — 99211 OFF/OP EST MAY X REQ PHY/QHP: CPT | Mod: PBBFAC | Performed by: OPHTHALMOLOGY

## 2017-12-14 PROCEDURE — 92285 EXTERNAL OCULAR PHOTOGRAPHY: CPT | Mod: PBBFAC | Performed by: OPHTHALMOLOGY

## 2017-12-14 PROCEDURE — 99999 PR PBB SHADOW E&M-EST. PATIENT-LVL I: CPT | Mod: PBBFAC,,, | Performed by: OPHTHALMOLOGY

## 2017-12-14 PROCEDURE — 92012 INTRM OPH EXAM EST PATIENT: CPT | Mod: S$PBB,,, | Performed by: OPHTHALMOLOGY

## 2017-12-14 RX ORDER — CYCLOSPORINE 0.5 MG/ML
1 EMULSION OPHTHALMIC 2 TIMES DAILY
Qty: 60 VIAL | Refills: 6 | Status: SHIPPED | OUTPATIENT
Start: 2017-12-14 | End: 2018-09-25

## 2017-12-14 NOTE — PROGRESS NOTES
HPI     Eye Problem    Additional comments: here for 3 week f/u lab, and chest x-ray review           Comments   Vision is the same. Stinging when eyes tear up. Not using any lubrication.  Photophobia same       Last edited by Supriya Pompa on 12/14/2017  3:46 PM. (History)            Assessment /Plan     For exam results, see Encounter Report.    Episcleritis, unspecified laterality  -     External/Slit Lamp Photography    Pain around both eyes  -     cycloSPORINE (RESTASIS) 0.05 % ophthalmic emulsion; Place 0.4 mLs (1 drop total) into both eyes 2 (two) times daily.  Dispense: 60 vial; Refill: 6    Chronic follicular conjunctivitis of left eye    Concern for euthyroid grave's disease. Patient's mother with thyroid disease. Also with recent + EBV antibodies. No corneal stromal opacification on exam today.     Prior CT Maxillofacial:  No gross signs of extraocular muscle enlargement.     TSI: wnl  Sjogren's: Pending  c-xray: negative for hilar adenopathy sarcoidosis.    Return in 6 weeks to reevaluate irritation.      Recommend aggressive lubrication with artificial tears up to qid.

## 2017-12-22 NOTE — TELEPHONE ENCOUNTER
Faxed PA info for restasis to [facility], fax #-[phone], #-[phone]- Ref #-[id number]- good from 11/22/17-12/22/18

## 2018-01-17 ENCOUNTER — TELEPHONE (OUTPATIENT)
Dept: OPHTHALMOLOGY | Facility: CLINIC | Age: 33
End: 2018-01-17

## 2018-07-02 ENCOUNTER — HOSPITAL ENCOUNTER (EMERGENCY)
Facility: HOSPITAL | Age: 33
Discharge: HOME | End: 2018-07-03
Payer: OTHER GOVERNMENT

## 2018-07-02 VITALS
HEIGHT: 75 IN | WEIGHT: 190 LBS | SYSTOLIC BLOOD PRESSURE: 127 MMHG | TEMPERATURE: 99 F | DIASTOLIC BLOOD PRESSURE: 75 MMHG | RESPIRATION RATE: 20 BRPM | OXYGEN SATURATION: 98 % | HEART RATE: 89 BPM | BODY MASS INDEX: 23.62 KG/M2

## 2018-07-02 DIAGNOSIS — R19.7 ACUTE DIARRHEA: Primary | ICD-10-CM

## 2018-07-02 DIAGNOSIS — R10.12 LEFT UPPER QUADRANT PAIN: ICD-10-CM

## 2018-07-02 LAB
ALBUMIN SERPL BCP-MCNC: 4.4 G/DL
ALP SERPL-CCNC: 84 U/L
ALT SERPL W/O P-5'-P-CCNC: 38 U/L
ANION GAP SERPL CALC-SCNC: 9 MMOL/L
AST SERPL-CCNC: 36 U/L
BASOPHILS # BLD AUTO: 0 K/UL
BASOPHILS NFR BLD: 0 %
BILIRUB SERPL-MCNC: 0.7 MG/DL
BUN SERPL-MCNC: 12 MG/DL
CALCIUM SERPL-MCNC: 10 MG/DL
CHLORIDE SERPL-SCNC: 102 MMOL/L
CO2 SERPL-SCNC: 27 MMOL/L
CREAT SERPL-MCNC: 1.3 MG/DL
DIFFERENTIAL METHOD: ABNORMAL
EOSINOPHIL # BLD AUTO: 0 K/UL
EOSINOPHIL NFR BLD: 0.3 %
ERYTHROCYTE [DISTWIDTH] IN BLOOD BY AUTOMATED COUNT: 14.2 %
EST. GFR  (AFRICAN AMERICAN): >60 ML/MIN/1.73 M^2
EST. GFR  (NON AFRICAN AMERICAN): >60 ML/MIN/1.73 M^2
GLUCOSE SERPL-MCNC: 87 MG/DL
HCT VFR BLD AUTO: 46.6 %
HGB BLD-MCNC: 15.5 G/DL
LIPASE SERPL-CCNC: 26 U/L
LYMPHOCYTES # BLD AUTO: 1.4 K/UL
LYMPHOCYTES NFR BLD: 14.8 %
MCH RBC QN AUTO: 31.8 PG
MCHC RBC AUTO-ENTMCNC: 33.3 G/DL
MCV RBC AUTO: 96 FL
MONOCYTES # BLD AUTO: 0.6 K/UL
MONOCYTES NFR BLD: 6.6 %
NEUTROPHILS # BLD AUTO: 7.5 K/UL
NEUTROPHILS NFR BLD: 78.3 %
PLATELET # BLD AUTO: 169 K/UL
PMV BLD AUTO: 9.4 FL
POTASSIUM SERPL-SCNC: 4.4 MMOL/L
PROT SERPL-MCNC: 8.3 G/DL
RBC # BLD AUTO: 4.88 M/UL
SODIUM SERPL-SCNC: 138 MMOL/L
WBC # BLD AUTO: 9.6 K/UL

## 2018-07-02 PROCEDURE — 36415 COLL VENOUS BLD VENIPUNCTURE: CPT

## 2018-07-02 PROCEDURE — 99284 EMERGENCY DEPT VISIT MOD MDM: CPT | Mod: 25

## 2018-07-02 PROCEDURE — 83690 ASSAY OF LIPASE: CPT

## 2018-07-02 PROCEDURE — 96374 THER/PROPH/DIAG INJ IV PUSH: CPT

## 2018-07-02 PROCEDURE — 96372 THER/PROPH/DIAG INJ SC/IM: CPT

## 2018-07-02 PROCEDURE — 63600175 PHARM REV CODE 636 W HCPCS: Performed by: NURSE PRACTITIONER

## 2018-07-02 PROCEDURE — 85025 COMPLETE CBC W/AUTO DIFF WBC: CPT

## 2018-07-02 PROCEDURE — 80053 COMPREHEN METABOLIC PANEL: CPT

## 2018-07-02 PROCEDURE — 81003 URINALYSIS AUTO W/O SCOPE: CPT

## 2018-07-02 RX ORDER — KETOROLAC TROMETHAMINE 30 MG/ML
15 INJECTION, SOLUTION INTRAMUSCULAR; INTRAVENOUS
Status: COMPLETED | OUTPATIENT
Start: 2018-07-02 | End: 2018-07-02

## 2018-07-02 RX ADMIN — KETOROLAC TROMETHAMINE 15 MG: 30 INJECTION, SOLUTION INTRAMUSCULAR at 11:07

## 2018-07-03 LAB
BILIRUB UR QL STRIP: NEGATIVE
CLARITY UR: CLEAR
COLOR UR: YELLOW
GLUCOSE UR QL STRIP: NEGATIVE
HGB UR QL STRIP: NEGATIVE
KETONES UR QL STRIP: NEGATIVE
LEUKOCYTE ESTERASE UR QL STRIP: NEGATIVE
NITRITE UR QL STRIP: NEGATIVE
PH UR STRIP: 8 [PH] (ref 5–8)
PROT UR QL STRIP: ABNORMAL
SP GR UR STRIP: 1.01 (ref 1–1.03)
URN SPEC COLLECT METH UR: ABNORMAL
UROBILINOGEN UR STRIP-ACNC: NEGATIVE EU/DL

## 2018-07-03 PROCEDURE — 63600175 PHARM REV CODE 636 W HCPCS: Performed by: NURSE PRACTITIONER

## 2018-07-03 RX ORDER — DICYCLOMINE HYDROCHLORIDE 10 MG/ML
20 INJECTION INTRAMUSCULAR
Status: COMPLETED | OUTPATIENT
Start: 2018-07-03 | End: 2018-07-03

## 2018-07-03 RX ORDER — DICYCLOMINE HYDROCHLORIDE 20 MG/1
20 TABLET ORAL 2 TIMES DAILY
Qty: 10 TABLET | Refills: 0 | Status: SHIPPED | OUTPATIENT
Start: 2018-07-03 | End: 2018-08-02

## 2018-07-03 RX ADMIN — DICYCLOMINE HYDROCHLORIDE 20 MG: 10 INJECTION INTRAMUSCULAR at 12:07

## 2018-07-03 NOTE — ED PROVIDER NOTES
DEncounter Date: 7/2/2018    SCRIBE #1 NOTE: I, Iris Guerrero, am scribing for, and in the presence of, SANDY Christianson.       History     Chief Complaint   Patient presents with    Abdominal Pain     pt states he has a hx of ebv and lymphoma      07/02/2018  10:37 PM     Chief Complaint: Flank Pain      Junito Tian is a 32 y.o. male presenting to the E.D. with an acute onset of left sided flank pain which began this afternoon and has been constant since onset. Pt reports pain radiates around to abdomen and is worsened with breathing. No alleviating factors. He denies blood in urine, urinary frequency, burning with urination, fever, scrotal pain. Pt reports recent dx of Lymphoma.           The history is provided by the patient.     Review of patient's allergies indicates:  No Known Allergies  Past Medical History:   Diagnosis Date    Achilles tendon rupture     LEFT     No past surgical history on file.  Family History   Problem Relation Age of Onset    Blindness Neg Hx     Glaucoma Neg Hx     Macular degeneration Neg Hx     Retinal detachment Neg Hx      Social History   Substance Use Topics    Smoking status: Current Some Day Smoker    Smokeless tobacco: Never Used      Comment: CIGARS    Alcohol use Yes      Comment: SOCIAL     Review of Systems   Constitutional: Negative for fever.   HENT: Negative for congestion.    Eyes: Negative for visual disturbance.   Respiratory: Negative for wheezing.    Cardiovascular: Negative for chest pain.   Gastrointestinal: Positive for abdominal pain.   Genitourinary: Positive for flank pain (L). Negative for dysuria.   Musculoskeletal: Negative for joint swelling.   Skin: Negative for rash.   Neurological: Negative for syncope.   Hematological: Does not bruise/bleed easily.   Psychiatric/Behavioral: Negative for confusion.       Physical Exam     Initial Vitals [07/02/18 2137]   BP Pulse Resp Temp SpO2   127/75 89 20 98.7 °F (37.1 °C) 98 %      MAP        --         Physical Exam    Nursing note and vitals reviewed.  Constitutional: He appears well-nourished.   Appears uncomfortable.    HENT:   Head: Normocephalic and atraumatic.   Eyes: Conjunctivae and EOM are normal.   Neck: Normal range of motion. Neck supple. No thyroid mass present.   Cardiovascular: Normal rate, regular rhythm and normal heart sounds. Exam reveals no gallop and no friction rub.    No murmur heard.  Pulmonary/Chest: Breath sounds normal. He has no wheezes. He has no rhonchi. He has no rales.   Abdominal: Soft. Normal appearance and bowel sounds are normal. There is no tenderness. There is CVA tenderness (L).   Neurological: He is alert and oriented to person, place, and time. He has normal strength. No cranial nerve deficit or sensory deficit.   Skin: Skin is warm and dry. No rash noted. No erythema.   Psychiatric: He has a normal mood and affect. His speech is normal. Cognition and memory are normal.         ED Course   Procedures  Labs Reviewed - No data to display       Imaging Results    None                APC / Resident Notes:   Junito Tian is a 32 old male presenting to the ED with c/o left flank pain. Patient thought he was constipated so he drank magnesium citrate which caused the pain to increase. Patient has no abnormal findings in urinalysis and CT shows no nephrolithiasis. There is evidence of colitis/enteritis but patient also had diarrhea earlier from drinking magnesium citrate. He appears well hydrated and nontoxic. He has no abdominal tenderness and labs are unremarkable. I do not suspect surgical abdomen or other emergent cause of his pain. Pain may be caused by gastroenteritis. He was given IM bentyl and discharged with this medication as well. Patient advised to follow up with PCP. SPecific return precautions discussed and patient verbalized understanding. Based on my clinical evaluation, I do not appreciate any immediate, emergent, or life threatening condition or etiology  that warrants additional workup today and feel that the patient can be discharged with close follow up care.          Scribe Attestation:   Scribe #1: I performed the above scribed service and the documentation accurately describes the services I performed. I attest to the accuracy of the note.    I, SANDY Christianson, personally performed the services described in this documentation. All medical record entries made by the scribe were at my direction and in my presence.  I have reviewed the chart and agree that the record reflects my personal performance and is accurate and complete. SANDY Christianson.  1:59 AM 07/03/2018             Clinical Impression:   The primary encounter diagnosis was Acute diarrhea. A diagnosis of Left upper quadrant pain was also pertinent to this visit.      Disposition:   Disposition: Discharged  Condition: Stable                        Marcela Francois NP  07/03/18 0202

## 2018-07-04 ENCOUNTER — HOSPITAL ENCOUNTER (EMERGENCY)
Facility: HOSPITAL | Age: 33
Discharge: HOME | End: 2018-07-04
Payer: OTHER GOVERNMENT

## 2018-07-04 VITALS
SYSTOLIC BLOOD PRESSURE: 128 MMHG | WEIGHT: 190 LBS | HEART RATE: 100 BPM | HEIGHT: 75 IN | DIASTOLIC BLOOD PRESSURE: 83 MMHG | BODY MASS INDEX: 23.62 KG/M2 | OXYGEN SATURATION: 97 % | RESPIRATION RATE: 20 BRPM | TEMPERATURE: 100 F

## 2018-07-04 DIAGNOSIS — K59.00 CONSTIPATION, UNSPECIFIED CONSTIPATION TYPE: Primary | ICD-10-CM

## 2018-07-04 DIAGNOSIS — M54.9 BACK PAIN: ICD-10-CM

## 2018-07-04 PROCEDURE — 99283 EMERGENCY DEPT VISIT LOW MDM: CPT

## 2018-07-04 RX ORDER — POLYETHYLENE GLYCOL 3350, SODIUM SULFATE ANHYDROUS, SODIUM BICARBONATE, SODIUM CHLORIDE, POTASSIUM CHLORIDE 236; 22.74; 6.74; 5.86; 2.97 G/4L; G/4L; G/4L; G/4L; G/4L
4 POWDER, FOR SOLUTION ORAL ONCE
Qty: 4000 ML | Refills: 0 | Status: SHIPPED | OUTPATIENT
Start: 2018-07-04 | End: 2018-07-04

## 2018-07-04 NOTE — ED PROVIDER NOTES
Encounter Date: 7/4/2018       History     Chief Complaint   Patient presents with    Back Pain     here last pm, saw pcp today - wants pt to get xray and ct of back     Patient is a 32 y.o. male who presents to the ED 07/04/2018 with a chief complaint of back pain. Pt has pain over left thoracic spine. Pt states he would like an xray as his PCP recommended because he believes it will show stool because he feels constipated as he has not had a BM in 4 days. He denies injury to his back or trauma. He denies history of cancer or IV drug abuse. Pt had a CT of his abdomen and pelvis yesterday evening without acute findings. Pt denies any fever or urinary symptoms. He states he usually has a BM 3 times a day but has been eating fast food which is unusual for him.                Review of patient's allergies indicates:  No Known Allergies  Past Medical History:   Diagnosis Date    Achilles tendon rupture     LEFT     No past surgical history on file.  Family History   Problem Relation Age of Onset    Blindness Neg Hx     Glaucoma Neg Hx     Macular degeneration Neg Hx     Retinal detachment Neg Hx      Social History   Substance Use Topics    Smoking status: Current Some Day Smoker    Smokeless tobacco: Never Used      Comment: CIGARS    Alcohol use Yes      Comment: SOCIAL     Review of Systems   Constitutional: Negative for chills and fever.   HENT: Negative for sore throat.    Respiratory: Negative for chest tightness and shortness of breath.    Cardiovascular: Negative for chest pain.   Gastrointestinal: Positive for constipation. Negative for abdominal distention, abdominal pain and diarrhea.   Genitourinary: Negative for dysuria.   Musculoskeletal: Positive for back pain. Negative for arthralgias and myalgias.   Skin: Negative for rash and wound.   Neurological: Negative for weakness and numbness.       Physical Exam     Initial Vitals [07/04/18 0054]   BP Pulse Resp Temp SpO2   128/83 100 20 99.6 °F (37.6  °C) 97 %      MAP       --         Physical Exam    Nursing note and vitals reviewed.  Constitutional: He appears well-developed and well-nourished.   HENT:   Head: Normocephalic and atraumatic.   Eyes: Conjunctivae are normal. Pupils are equal, round, and reactive to light. Right eye exhibits no discharge. Left eye exhibits no discharge.   Neck: Normal range of motion. Neck supple.   Cardiovascular: Normal rate, regular rhythm, normal heart sounds and intact distal pulses.   Pulmonary/Chest: Breath sounds normal.   Abdominal: Soft. Bowel sounds are normal. There is no tenderness. There is no rigidity, no rebound, no guarding, no CVA tenderness, no tenderness at McBurney's point and negative Espinoza's sign.   Musculoskeletal: Normal range of motion. He exhibits tenderness. He exhibits no edema.        Cervical back: Normal.        Thoracic back: He exhibits tenderness and pain. He exhibits normal range of motion, no bony tenderness, no swelling, no edema, no deformity, no laceration, no spasm and normal pulse.        Lumbar back: Normal.        Back:    Neurological: He is alert and oriented to person, place, and time. He has normal strength. No sensory deficit.   Skin: Skin is warm and dry.   Psychiatric: He has a normal mood and affect.         ED Course   Procedures  Labs Reviewed - No data to display       Imaging Results          X-Ray Thoracic Spine AP Lateral (In process)                  Medical Decision Making:   Differential Diagnosis:   Fracture  Muscle strain  Constipation        APC / Resident Notes:   Patient is a 32 y.o. male who presents to the ED 07/04/2018 who underwent emergent evaluation for atraumatic thoracic back pain. Pt states he has a history of this and it has been associated with his constipation in the past. He is requesting xray of the area as recommended by his PCP today. He has no midline tenderness of his C, T, or L spine. He has no history of CA or IV drug abuse. He has 5/5 strength  and normal sensation bi lower extremities. Do not think acute spinal cord impingement. He has tenderness to the left of his thoracic spine. He has absolutely no abdominal tenderness or flank pain. He has no abdominal distention. Rectal exam without impaction or palpable mass. Do not think impaction. Upon closer review of his CT from last evening there is a large amount of stool in his large intestine. He also has dilated bowel loops in his upper abdomen visible on thoracic xray consistent with gas pattern and constipation. Do not think obstruction based on CT and PE. He has no fracture or mass on xray. Do not think fracture or mass or bony lesion. He is treated with golytely for his constipation. He has been taking stool softeners and magnesium citrate without relief at home. Based on my clinical evaluation, I do not appreciate any immediate, emergent, or life threatening condition or etiology that warrants additional workup today and feel that the patient can be discharged with close follow up care. Case discussed with Dr. Dowling who is agreeable to plan of care. Follow up and return precautions discussed; patient verbalized understanding and is agreeable to plan of care. Patient discharged home in stable condition.                   Attending Attestation:           Physician Attestation for Scribe:  Physician Attestation Statement for Scribe #1: I, Kelly Shaw, reviewed documentation, as scribed by in my presence, and it is both accurate and complete.     Comments: I, SANDY Palmer, personally performed the services described in this documentation. All medical record entries made by the scribe were at my direction and in my presence.  I have reviewed the chart and agree that the record reflects my personal performance and is accurate and complete. SANDY Palmer.  1:54 AM 07/04/2018 e               Clinical Impression:   The primary encounter diagnosis was Constipation, unspecified constipation type. A  diagnosis of Back pain was also pertinent to this visit.      Disposition:   Disposition: Discharged  Condition: Stable                        Kelly Shaw NP  07/04/18 0154       Kelly Shaw NP  07/04/18 0211

## 2018-08-17 ENCOUNTER — OFFICE VISIT (OUTPATIENT)
Dept: PODIATRY | Facility: CLINIC | Age: 33
End: 2018-08-17
Payer: OTHER GOVERNMENT

## 2018-08-17 VITALS — BODY MASS INDEX: 24.5 KG/M2 | WEIGHT: 197.06 LBS | HEIGHT: 75 IN

## 2018-08-17 DIAGNOSIS — M21.40 ACQUIRED FLEXIBLE FLAT FOOT, UNSPECIFIED LATERALITY: Primary | ICD-10-CM

## 2018-08-17 PROCEDURE — 99999 PR PBB SHADOW E&M-EST. PATIENT-LVL III: CPT | Mod: PBBFAC,,, | Performed by: PODIATRIST

## 2018-08-17 PROCEDURE — 99203 OFFICE O/P NEW LOW 30 MIN: CPT | Mod: S$PBB,,, | Performed by: PODIATRIST

## 2018-08-17 PROCEDURE — 99213 OFFICE O/P EST LOW 20 MIN: CPT | Mod: PBBFAC,PO | Performed by: PODIATRIST

## 2018-08-17 NOTE — PROGRESS NOTES
Subjective:      Patient ID: Junito Tian is a 32 y.o. male.    Chief Complaint: Foot Pain (bilateral)    Discomfort / pain left more than right arch.  Gradual onset, worsening over past several weeks following return to activity following achilles repair last year., aggravated by increased weight bearing, shoe gear, pressure.  No previous medical treatment.  OTC pain med not helping.     Review of Systems   Constitution: Negative for chills, diaphoresis, fever, malaise/fatigue and night sweats.   Cardiovascular: Negative for claudication, cyanosis, leg swelling and syncope.   Skin: Negative for color change, dry skin, nail changes, rash, suspicious lesions and unusual hair distribution.   Musculoskeletal: Negative for falls, joint pain, joint swelling, muscle cramps, muscle weakness and stiffness.   Gastrointestinal: Negative for constipation, diarrhea, nausea and vomiting.   Neurological: Negative for brief paralysis, disturbances in coordination, focal weakness, numbness, paresthesias, sensory change and tremors.           Objective:      Physical Exam   Constitutional: He is oriented to person, place, and time. He appears well-developed and well-nourished. He is cooperative. No distress.   Cardiovascular:   Pulses:       Popliteal pulses are 2+ on the right side, and 2+ on the left side.        Dorsalis pedis pulses are 2+ on the right side, and 2+ on the left side.        Posterior tibial pulses are 2+ on the right side, and 2+ on the left side.   Capillary refill 3 seconds all toes/distal feet, all toes/both feet warm to touch.      Negative lymphadenopathy bilateral popliteal fossa and tarsal tunnel.      Negavie lower extremity edema bilateral.     Musculoskeletal:        Right ankle: He exhibits normal range of motion, no swelling, no ecchymosis, no deformity, no laceration and normal pulse. Achilles tendon normal. Achilles tendon exhibits no pain, no defect and normal Yeager's test results.    Hypermobile MTJ, STJ bilateral without currernt pain to palpation or range of motion right and left.    Otherwise, Normal angle, base, station of gait. All ten toes without clubbing, cyanosis, or signs of ischemia.  No pain to palpation bilateral lower extremities.  Range of motion, stability, muscle strength, and muscle tone normal bilateral feet and legs.     Lymphadenopathy: No inguinal adenopathy noted on the right or left side.   Negative lymphadenopathy bilateral popliteal fossa and tarsal tunnel.    Negative lymphangitic streaking bilateral feet/ankles/legs.   Neurological: He is alert and oriented to person, place, and time. He has normal strength. He displays no atrophy and no tremor. No sensory deficit. He exhibits normal muscle tone. Gait normal.   Reflex Scores:       Patellar reflexes are 2+ on the right side and 2+ on the left side.       Achilles reflexes are 2+ on the right side and 2+ on the left side.  Negative tinel sign to percussion sural, superficial peroneal, deep peroneal, saphenous, and posterior tibial nerves right and left ankles and feet.     Skin: Skin is warm, dry and intact. Capillary refill takes 2 to 3 seconds. No abrasion, no bruising, no burn, no ecchymosis, no laceration, no lesion and no rash noted. He is not diaphoretic. No cyanosis or erythema. No pallor. Nails show no clubbing.     Skin is normal age and health appropriate color, turgor, texture, and temperature bilateral lower extremities without ulceration, hyperpigmentation, discoloration, masses nodules or cords palpated.  No ecchymosis, erythema, edema, or cardinal signs of infection bilateral lower extremities.     Psychiatric: He has a normal mood and affect.             Assessment:       Encounter Diagnosis   Name Primary?    Acquired flexible flat foot, unspecified laterality Yes         Plan:       Junito was seen today for foot pain.    Diagnoses and all orders for this visit:    Acquired flexible flat foot,  unspecified laterality  -     ORTHOTIC DEVICE (DME)      I counseled the patient on his conditions, their implications and medical management.    Patient will stretch the tendo achilles complex three times daily as demonstrated in the office.  Literature was dispensed illustrating proper stretching technique.    Patient will obtain over the counter arch supports and wear them in shoes whenever possible.  Athletic shoes intended for walking or running are usually best.    The patient was advised that NSAID-type medications have two very important potential side effects: gastrointestinal irritation including hemorrhage and renal injuries. He was asked to take the medication with food and to stop if he experiences any GI upset. I asked him to call for vomiting, abdominal pain or black/bloody stools. The patient expresses understanding of these issues and questions were answered.    Discussed conservative treatment with shoes of adequate dimensions, material, and style to alleviate symptoms and delay or prevent surgical intervention.    Rx custom orthotics.          Follow-up if symptoms worsen or fail to improve.

## 2018-09-25 ENCOUNTER — OFFICE VISIT (OUTPATIENT)
Dept: URGENT CARE | Facility: CLINIC | Age: 33
End: 2018-09-25
Payer: OTHER GOVERNMENT

## 2018-09-25 VITALS
BODY MASS INDEX: 27.72 KG/M2 | HEIGHT: 71 IN | RESPIRATION RATE: 16 BRPM | TEMPERATURE: 98 F | DIASTOLIC BLOOD PRESSURE: 87 MMHG | WEIGHT: 198 LBS | SYSTOLIC BLOOD PRESSURE: 135 MMHG | OXYGEN SATURATION: 100 % | HEART RATE: 77 BPM

## 2018-09-25 DIAGNOSIS — Z20.2 EXPOSURE TO STD: Primary | ICD-10-CM

## 2018-09-25 PROCEDURE — 96372 THER/PROPH/DIAG INJ SC/IM: CPT | Mod: S$GLB,,, | Performed by: NURSE PRACTITIONER

## 2018-09-25 PROCEDURE — 99213 OFFICE O/P EST LOW 20 MIN: CPT | Mod: 25,S$GLB,, | Performed by: NURSE PRACTITIONER

## 2018-09-25 RX ORDER — DOXYCYCLINE 100 MG/1
100 CAPSULE ORAL 2 TIMES DAILY
Qty: 20 CAPSULE | Refills: 0 | Status: SHIPPED | OUTPATIENT
Start: 2018-09-25 | End: 2018-10-05

## 2018-09-25 RX ORDER — AZITHROMYCIN 1 G/1
1 POWDER, FOR SUSPENSION ORAL ONCE
Qty: 1 PACKET | Refills: 0 | Status: SHIPPED | OUTPATIENT
Start: 2018-09-25 | End: 2018-09-25

## 2018-09-25 RX ORDER — CEFTRIAXONE 1 G/1
1 INJECTION, POWDER, FOR SOLUTION INTRAMUSCULAR; INTRAVENOUS
Status: COMPLETED | OUTPATIENT
Start: 2018-09-25 | End: 2018-09-25

## 2018-09-25 RX ADMIN — CEFTRIAXONE 1 G: 1 INJECTION, POWDER, FOR SOLUTION INTRAMUSCULAR; INTRAVENOUS at 07:09

## 2018-09-26 NOTE — PATIENT INSTRUCTIONS
What Are Sexually Transmitted Diseases (STDs)?  A sexually transmitted disease (STD) is a disease that is spread during sex. (An STD can also be called STI for sexually transmitted infection.) You can become infected with an STD if you have sex with someone who has an STD. Any sex that involves the penis, vagina, anus, or mouth can spread disease. Some STDs spread through body fluids such as semen, vaginal fluid, or blood. Others spread through contact with affected skin.  Who is at risk?     Places on or in the body where STDs cause damage include reproductive organs, the rectum, and the mouth.   It doesnt matter if youre straight or hull, male or female, young or old. Any person who has sex can get an STD. Your risk increases if:  · You have more than one partner. The more partners you have, the greater your risk.  · Your partner has other partners. If your partner is exposed to an STD, you could be, too.  · You or your partner have had sex with other people in the past. Either of you might be carrying an STD from an earlier partner.  · You have an STD. The STD may cause sores or other health problems that increase your risk of new infections. Your risk will stay high unless you change the behaviors that put you at risk of the current infection.  Prevent future problems  Left untreated, certain STDs can lead to cancer or even death. Some can harm unborn babies whose mothers are infected. Others can cause you to not be able to have children (sterility) or can affect changes in behavior or your ability to think. You can prevent these problems with safer sex, regular checkups, and early treatment. Always use a latex condom when you have sex. Get tested if youre at risk. And get treated early if you have an STD.  Getting checked  The only sure way to know if you have an STD is to get checked by a healthcare provider. If you notice a change in how your body looks or feels, have it checked out. But keep in mind,  STDs dont always show symptoms. So if youre at risk of STDs, get checked regularly. If you find you have an STD, be sure your partner gets treatment, too. If not, his or her health is at risk. And left untreated, your partner could pass the STD back to you, or on to others.  Common symptoms  Be alert to any changes in your body and your partners body. Symptoms may appear in or near the vagina, penis, rectum, mouth, or throat. They include:  · Unusual discharge  · Lumps, bumps, or rashes  · Sores that may be painful, itchy, or painless  · Itchy skin  · Burning with urination  · Pain in the pelvis, belly (abdomen), or rectum  Even if you dont have symptoms  You may have an STD, even if you dont have symptoms. If you think you are at risk, get checked. Go to a clinic or to your healthcare provider. If your partner has an STD, you need to be tested too, even if you feel fine.  Vaccines to prevent disease  Vaccines (also called immunizations) are available to prevent hepatitis A and hepatitis B. These are two kinds of STDs. There is also a vaccine to prevent HPV. This is a virus that can be passed from person to person through sexual contact. Ask your healthcare provider whether any of these vaccines is right for you.   Date Last Reviewed: 11/1/2016  © 6872-7600 [employer]. [address] [place] [address], [place] [address]. All rights reserved. This information is not intended as a substitute for professional medical care. Always follow your healthcare professional's instructions.

## 2018-09-26 NOTE — PROGRESS NOTES
"Subjective:       Patient ID: Junito Tian is a 32 y.o. male.    Vitals:  height is 5' 11" (1.803 m) and weight is 89.8 kg (198 lb). His temperature is 98.1 °F (36.7 °C). His blood pressure is 135/87 and his pulse is 77. His respiration is 16 and oxygen saturation is 100%.     Chief Complaint: Testicle Pain    Exposed to STD x1 week ago. Dysuria for several days. Denies penile discharge. States partner was told to inform him of STD exposure - pt unsure which STD.       Testicle Pain   The patient's pertinent negatives include no genital lesions, penile discharge, penile pain or testicular pain. This is a new problem. The current episode started in the past 7 days. The problem occurs constantly. Associated symptoms include dysuria. Pertinent negatives include no discolored urine, flank pain, frequency or hematuria.     Review of Systems   Constitution: Negative.   Eyes: Negative.    Respiratory: Negative.    Genitourinary: Positive for dysuria. Negative for discharge, flank pain, frequency, hematuria, penile pain and testicular pain.        Foul smelling urine   All other systems reviewed and are negative.      Objective:      Physical Exam   Constitutional: He appears well-developed and well-nourished.   Eyes: EOM are normal.   Cardiovascular: Normal rate and regular rhythm.   Abdominal: Soft.   Genitourinary: Testes normal and penis normal. No penile erythema or penile tenderness. No discharge found.   Genitourinary Comments: No penile discharge, lesions, or tenderness. No testicle swelling or tenderness   Skin: Skin is warm and dry.       Assessment:       No diagnosis found.    Plan:         There are no diagnoses linked to this encounter.     "

## 2018-09-29 LAB
BACTERIA UR CULT: NO GROWTH
BACTERIA UR CULT: NORMAL
C TRACH RRNA SPEC QL NAA+PROBE: NEGATIVE
N GONORRHOEA RRNA SPEC QL NAA+PROBE: NEGATIVE

## 2018-10-17 ENCOUNTER — OFFICE VISIT (OUTPATIENT)
Dept: UROLOGY | Facility: CLINIC | Age: 33
End: 2018-10-17
Payer: OTHER GOVERNMENT

## 2018-10-17 VITALS
BODY MASS INDEX: 28.37 KG/M2 | SYSTOLIC BLOOD PRESSURE: 142 MMHG | WEIGHT: 202.63 LBS | HEIGHT: 71 IN | DIASTOLIC BLOOD PRESSURE: 60 MMHG | HEART RATE: 78 BPM

## 2018-10-17 DIAGNOSIS — D17.6 LIPOMA OF SPERMATIC CORD: ICD-10-CM

## 2018-10-17 DIAGNOSIS — N50.819 TESTALGIA: ICD-10-CM

## 2018-10-17 DIAGNOSIS — N50.82 SCROTAL PAIN: Primary | ICD-10-CM

## 2018-10-17 LAB
BILIRUB SERPL-MCNC: NORMAL MG/DL
BLOOD URINE, POC: NORMAL
COLOR, POC UA: YELLOW
GLUCOSE UR QL STRIP: NORMAL
KETONES UR QL STRIP: NORMAL
LEUKOCYTE ESTERASE URINE, POC: NORMAL
NITRITE, POC UA: NORMAL
PH, POC UA: 6
PROTEIN, POC: NORMAL
SPECIFIC GRAVITY, POC UA: 1.02
UROBILINOGEN, POC UA: NORMAL

## 2018-10-17 PROCEDURE — 99999 PR PBB SHADOW E&M-EST. PATIENT-LVL III: CPT | Mod: PBBFAC,,, | Performed by: UROLOGY

## 2018-10-17 PROCEDURE — 87086 URINE CULTURE/COLONY COUNT: CPT

## 2018-10-17 PROCEDURE — 99204 OFFICE O/P NEW MOD 45 MIN: CPT | Mod: S$PBB,,, | Performed by: UROLOGY

## 2018-10-17 PROCEDURE — 87661 TRICHOMONAS VAGINALIS AMPLIF: CPT

## 2018-10-17 PROCEDURE — 81002 URINALYSIS NONAUTO W/O SCOPE: CPT | Mod: PBBFAC,PO | Performed by: UROLOGY

## 2018-10-17 PROCEDURE — 87491 CHLMYD TRACH DNA AMP PROBE: CPT

## 2018-10-17 PROCEDURE — 99213 OFFICE O/P EST LOW 20 MIN: CPT | Mod: PBBFAC,PO | Performed by: UROLOGY

## 2018-10-17 RX ORDER — SULFAMETHOXAZOLE AND TRIMETHOPRIM 800; 160 MG/1; MG/1
1 TABLET ORAL 2 TIMES DAILY
Qty: 28 TABLET | Refills: 0 | Status: SHIPPED | OUTPATIENT
Start: 2018-10-17 | End: 2018-10-27

## 2018-10-18 LAB
BACTERIA UR CULT: NO GROWTH
C TRACH DNA SPEC QL NAA+PROBE: NOT DETECTED
N GONORRHOEA DNA SPEC QL NAA+PROBE: NOT DETECTED

## 2018-10-19 ENCOUNTER — HOSPITAL ENCOUNTER (OUTPATIENT)
Dept: RADIOLOGY | Facility: CLINIC | Age: 33
Discharge: HOME | End: 2018-10-19
Attending: UROLOGY
Payer: OTHER GOVERNMENT

## 2018-10-19 DIAGNOSIS — N50.82 SCROTAL PAIN: ICD-10-CM

## 2018-10-19 LAB
TRICHOMONAS VAGINALIS RNA, QUAL, SOURCE: NORMAL
TRICHOMONAS VAGINALIS, QL, TMA: NOT DETECTED

## 2018-10-19 PROCEDURE — 76870 US EXAM SCROTUM: CPT | Mod: 26,,, | Performed by: RADIOLOGY

## 2018-10-19 PROCEDURE — 76870 US EXAM SCROTUM: CPT | Mod: TC,PO

## 2018-10-31 ENCOUNTER — OFFICE VISIT (OUTPATIENT)
Dept: URGENT CARE | Facility: CLINIC | Age: 33
End: 2018-10-31
Payer: OTHER GOVERNMENT

## 2018-10-31 VITALS
RESPIRATION RATE: 16 BRPM | HEART RATE: 77 BPM | WEIGHT: 203 LBS | BODY MASS INDEX: 28.42 KG/M2 | OXYGEN SATURATION: 97 % | TEMPERATURE: 99 F | HEIGHT: 71 IN | DIASTOLIC BLOOD PRESSURE: 94 MMHG | SYSTOLIC BLOOD PRESSURE: 147 MMHG

## 2018-10-31 DIAGNOSIS — N39.0 URINARY TRACT INFECTION WITHOUT HEMATURIA, SITE UNSPECIFIED: ICD-10-CM

## 2018-10-31 DIAGNOSIS — R30.0 DYSURIA: ICD-10-CM

## 2018-10-31 DIAGNOSIS — N50.819 TESTICLE PAIN: Primary | ICD-10-CM

## 2018-10-31 LAB
BILIRUB UR QL STRIP: NEGATIVE
GLUCOSE UR QL STRIP: NEGATIVE
KETONES UR QL STRIP: NEGATIVE
LEUKOCYTE ESTERASE UR QL STRIP: NEGATIVE
PH, POC UA: 6.5
POC BLOOD, URINE: NEGATIVE
POC NITRATES, URINE: NEGATIVE
PROT UR QL STRIP: POSITIVE
SP GR UR STRIP: 1.01 (ref 1–1.03)
UROBILINOGEN UR STRIP-ACNC: NORMAL (ref 0.3–2.2)

## 2018-10-31 PROCEDURE — 81003 URINALYSIS AUTO W/O SCOPE: CPT | Mod: QW,S$GLB,, | Performed by: EMERGENCY MEDICINE

## 2018-10-31 PROCEDURE — 99213 OFFICE O/P EST LOW 20 MIN: CPT | Mod: 25,S$GLB,, | Performed by: EMERGENCY MEDICINE

## 2018-10-31 RX ORDER — CIPROFLOXACIN 500 MG/1
500 TABLET ORAL 2 TIMES DAILY
Qty: 20 TABLET | Refills: 0 | Status: SHIPPED | OUTPATIENT
Start: 2018-10-31 | End: 2018-11-10

## 2018-10-31 NOTE — PATIENT INSTRUCTIONS
Bladder Infection, Male (Adult)    You have a bladder infection.  Urine is normally free of bacteria. But bacteria can get into the urinary tract from the skin around the rectum or it may travel in the blood from elsewhere in the body.  This is called a urinary tract infection (UTI). An infection can occur anywhere in the urinary tract. It could be in a kidney (pyelonephritis)or in the bladder (cystitis) and urethra (urethritis). The urethra is the tube that drains the urine from the bladder through the tip of the penis.  The most common place for a UTI is in the bladder. This is called a bladder infection. Most bladder infections are easily treated. They are not serious unless the infection spreads up to the kidney.  The terms bladder infection, UTI, and cystitis are often used to describe the same thing, but they arent always the same. Cystitis is an inflammation of the bladder. The most common cause of cystitis is an infection.   Keep in mind:  · Infections in the urine are called UTIs.  · Cystitis is usually caused by a UTI.  · Not all UTIs and cases of cystitis are bladder infections.  · Bladder infections are the most common type of cystitis.  Symptoms of a bladder infection  The infection causes inflammation in the urethra and bladder. This inflammation causes many of the symptoms. The most common symptoms of a bladder infection are:  · Pain or burning when urinating  · Having to go more often than usual  · Feeling like you need to go right away  · Only a small amount comes out  · Blood in urine  · Discomfort in your belly (abdomen), usually in the lower abdomen, above the pubic bone  · Cloudy, strong, or bad smelling urine  · Unable to urinate (retention)  · Urinary incontinence  · Fever  · Loss of appetite  Older adults may also feel confused.  Causes of a bladder infection  Bladder infections are not contagious. You can't get one from someone else, from a toilet seat, or from sharing a bath.  The most  common cause of bladder infections is bacteria from the bowels. The bacteria get onto the skin around the opening of the urethra. From there they can get into the urine and travel up to the bladder. This causes inflammation and an infection. This usually happens because of:  · An enlarged prostate  · Poor cleaning of the genitals  · Procedures that put a tube in your bladder, like a Garland catheter  · Bowel incontinence  · Older age  · Not emptying your bladder (The urine stays there, giving the bacteria a chance to grow.)  · Dehydration (This allows urine to stay in the bladder longer.)  · Constipation (This can cause the bowels to push on the bladder or urethra and keep the bladder from emptying.)  Treatment  Bladder infections are treated with antibiotics. They usually clear up quickly without complications. Treatment helps prevent a more serious kidney infection.  Medicines  Medicines can help in the treatment of a bladder infection:  · You may have been given phenazopyridine to ease burning when you urinate. It will cause your urine to be bright orange. It can stain clothing.  · You may have been prescribed antibiotics. Take this medicine until you have finished it, even if you feel better. Taking all of the medicine will make sure the infection has cleared.  You can use acetaminophen or ibuprofen for pain, fever, or discomfort, unless another medicine was prescribed. You can also alternate them, or use both together. They work differently and are a different class of medicines, so taking them together is not an overdose. If you have chronic liver or kidney disease, talk with your healthcare provider before using these medicines. Also talk with your provider if youve had a stomach ulcer or GI bleeding or are taking blood thinner medicines.  Home care  Here are some guidelines to help you care for yourself at home:  · Drink plenty of fluids, unless your healthcare provider told you not to. Fluids will prevent  dehydration and flush out your bladder.  · Use good personal hygiene. Wipe from front to back after using the toilet, and clean your penis regularly. If you arent circumcised, retract the foreskin when cleaning.  · Urinate more frequently, and dont try to hold it in for long periods of time, if possible.  · Wear loose-fitting clothes and cotton underwear. Avoid tight-fitting pants. This helps keep you clean and dry.  · Change your diet to prevent constipation. This means eating more fresh foods and more fiber, and less junk and fatty foods.  · Avoid sex until your symptoms are gone.  · Avoid caffeine, alcohol, and spicy foods. These can irritate the bladder.  Follow-up care  Follow up with your healthcare provider, or as advised if all symptoms have not cleared up within 5 days. It is important to keep your follow-up appointment. You can talk with your provider to see if you need more tests of the urinary tract. This is especially important if you have infections that keep coming back.  If a culture was done, you will be told if your treatment needs to be changed. If directed, you can call to find out the results.  If X-rays were taken, you will be told of any findings that may affect your care.  Call 911  Call 911 if any of these occur:  · Trouble breathing  · Difficulty waking up  · Feeling confused  · Fainting or loss of consciousness  · Rapid heart rate  When to seek medical advice  Call your healthcare provider right away if any of these occur:  · Fever of 100.4ºF (38ºC) or higher, or as directed by your healthcare provider  · Your symptoms dont improve after 2 days of treatment  · Back or abdominal pain that gets worse  · Repeated vomiting, or you arent able to keep medicine down  · Weakness or dizziness  Date Last Reviewed: 10/1/2016  © 3982-2683 [employer]. [address] [place] [address], [place] [address]. All rights reserved. This information is not intended as a substitute for professional  medical care. Always follow your healthcare professional's instructions.

## 2018-10-31 NOTE — PROGRESS NOTES
"Subjective:       Patient ID: Junito Tian is a 32 y.o. male.    Vitals:  height is 5' 11" (1.803 m) and weight is 92.1 kg (203 lb). His temperature is 98.5 °F (36.9 °C). His blood pressure is 147/94 (abnormal) and his pulse is 77. His respiration is 16 and oxygen saturation is 97%.     Chief Complaint: Testicle Pain and Urinary Frequency ([place] upon urination)    Pt states he has been experiencing dysuria x 1 week. He was tx for enterococcus UTI 1 month ago for same complaint and symptoms resolved. Pt has urine culture report and bacteria pansensitive. He denies penile discharge, flank pain or testicular pain.       Review of Systems   Constitution: Negative for chills and fever.   Eyes: Negative for discharge.   Skin: Negative for flushing and rash.   Musculoskeletal: Negative for back pain.   Gastrointestinal: Negative for nausea and vomiting.   Genitourinary: Positive for dysuria. Negative for genital sores, hematuria and urgency.   All other systems reviewed and are negative.      Objective:      Physical Exam   Constitutional: He is oriented to person, place, and time. He appears well-developed and well-nourished. No distress.   HENT:   Head: Normocephalic and atraumatic.   Right Ear: External ear normal.   Left Ear: External ear normal.   Nose: Nose normal. No nasal deformity. No epistaxis.   Mouth/Throat: Oropharynx is clear and moist and mucous membranes are normal.   Eyes: Conjunctivae and lids are normal.   Neck: Trachea normal, normal range of motion and phonation normal. Neck supple.   Cardiovascular: Normal rate, regular rhythm, normal heart sounds and intact distal pulses.   Pulmonary/Chest: Effort normal and breath sounds normal.   Abdominal: Soft. Normal appearance and bowel sounds are normal. He exhibits no distension. There is no tenderness. There is no CVA tenderness.   Genitourinary:   Genitourinary Comments: No cva tenderness   Musculoskeletal: Normal range of motion.   Neurological: " He is alert and oriented to person, place, and time. He has normal reflexes.   Skin: Skin is warm, dry and intact. He is not diaphoretic.   Psychiatric: He has a normal mood and affect. His speech is normal and behavior is normal. Judgment and thought content normal. Cognition and memory are normal.   Nursing note and vitals reviewed.      Assessment:       1. Testicle pain    2. Dysuria    3. Urinary tract infection without hematuria, site unspecified        Plan:         Testicle pain  -     POCT Urinalysis, Dipstick, Automated, W/O Scope    Dysuria    Urinary tract infection without hematuria, site unspecified    Other orders  -     ciprofloxacin HCl (CIPRO) 500 MG tablet; Take 1 tablet (500 mg total) by mouth 2 (two) times daily. for 10 days  Dispense: 20 tablet; Refill: 0       pt is not experiencing testicular pain - dx was entered by MA for u/a - pt clarified no testicular pain only experiencing dysuria.

## 2018-11-05 ENCOUNTER — TELEPHONE (OUTPATIENT)
Dept: UROLOGY | Facility: CLINIC | Age: 33
End: 2018-11-05

## 2018-11-05 NOTE — TELEPHONE ENCOUNTER
----- Message from Eliot Shannon sent at 11/5/2018  3:05 PM CST -----  Contact: pt   States he's calling to be worked in for a follow up before the next avail on 11/16 and can be reached at [phone]//thanks/dbw     Anytime this week

## 2018-11-16 ENCOUNTER — OFFICE VISIT (OUTPATIENT)
Dept: UROLOGY | Facility: CLINIC | Age: 33
End: 2018-11-16
Payer: OTHER GOVERNMENT

## 2018-11-16 VITALS
WEIGHT: 203.06 LBS | SYSTOLIC BLOOD PRESSURE: 120 MMHG | HEIGHT: 71 IN | DIASTOLIC BLOOD PRESSURE: 74 MMHG | BODY MASS INDEX: 28.43 KG/M2 | HEART RATE: 93 BPM

## 2018-11-16 DIAGNOSIS — N50.819 TESTICLE PAIN: Primary | ICD-10-CM

## 2018-11-16 LAB
BILIRUB SERPL-MCNC: NORMAL MG/DL
BLOOD URINE, POC: NORMAL
COLOR, POC UA: YELLOW
GLUCOSE UR QL STRIP: NORMAL
KETONES UR QL STRIP: NORMAL
LEUKOCYTE ESTERASE URINE, POC: NORMAL
NITRITE, POC UA: NORMAL
PH, POC UA: 5
PROTEIN, POC: NORMAL
SPECIFIC GRAVITY, POC UA: 1.01
UROBILINOGEN, POC UA: NORMAL

## 2018-11-16 PROCEDURE — 81002 URINALYSIS NONAUTO W/O SCOPE: CPT | Mod: PBBFAC,PO | Performed by: UROLOGY

## 2018-11-16 PROCEDURE — 99213 OFFICE O/P EST LOW 20 MIN: CPT | Mod: PBBFAC,PO | Performed by: UROLOGY

## 2018-11-16 PROCEDURE — 99999 PR PBB SHADOW E&M-EST. PATIENT-LVL III: CPT | Mod: PBBFAC,,, | Performed by: UROLOGY

## 2018-11-16 PROCEDURE — 99214 OFFICE O/P EST MOD 30 MIN: CPT | Mod: S$PBB,,, | Performed by: UROLOGY

## 2018-11-16 RX ORDER — AMOXICILLIN AND CLAVULANATE POTASSIUM 875; 125 MG/1; MG/1
1 TABLET, FILM COATED ORAL 2 TIMES DAILY
Qty: 28 TABLET | Refills: 0 | Status: SHIPPED | OUTPATIENT
Start: 2018-11-16 | End: 2018-11-26

## 2018-11-16 NOTE — PROGRESS NOTES
Subjective:       Patient ID: Junito Tian is a 32 y.o. male.    Chief Complaint: Testicle Pain (left)    HPI     32 year old with left testicular pain which has been a problem for the last 2 months.  Urine culture was initially positive for Enteroccocus.   He has completed multiple courses of antibiotics including Cipro x2, Bactrim and doxy.  All subsequent cultures incl;uding GC/Chlamydia are negative.  He has left flank pain several months ago and Had CT scan for suspected stone.  I reviewed the images and there is a calcification near the distal left ureter but no hydro and UA is negative for blood.   DOROTA reviewed.  There is a small right epididymal cyst otherwise normal study.  He also complains of dysuria and urinary frequency.   Urine dipstick shows negative for all components.    Review of Systems   Constitutional: Negative for fever.   Genitourinary: Positive for testicular pain. Negative for dysuria and hematuria.       Objective:      Physical Exam   Constitutional: He is oriented to person, place, and time. He appears well-developed and well-nourished.   HENT:   Head: Normocephalic and atraumatic.   Eyes: Conjunctivae are normal.   Cardiovascular: Normal rate.   Pulmonary/Chest: Effort normal.   Genitourinary: Penis normal. Right testis shows no mass (small epididymal cyst) and no tenderness. Left testis shows tenderness. Left testis shows no mass.   Musculoskeletal: Normal range of motion. He exhibits no edema.   Neurological: He is alert and oriented to person, place, and time.   Skin: Skin is warm and dry. No rash noted.   Psychiatric: He has a normal mood and affect.   Vitals reviewed.      Assessment:       1. Testicle pain        Plan:       Testicle pain  -     POCT URINE DIPSTICK WITHOUT MICROSCOPE    Other orders  -     amoxicillin-clavulanate 875-125mg (AUGMENTIN) 875-125 mg per tablet; Take 1 tablet by mouth 2 (two) times daily. for 10 days  Dispense: 28 tablet; Refill: 0      Based on  previous culture results will treated with a course of Augmentin.  If no improvement, plan cystoscopy

## 2018-11-26 ENCOUNTER — TELEPHONE (OUTPATIENT)
Dept: UROLOGY | Facility: CLINIC | Age: 33
End: 2018-11-26

## 2018-11-26 NOTE — TELEPHONE ENCOUNTER
----- Message from Monisha Rust sent at 11/26/2018 10:57 AM CST -----  Contact: Self  Patient needs to get in this week for testicle pain     No appts available this week     Please call back to schedule [phone]

## 2018-11-26 NOTE — TELEPHONE ENCOUNTER
I advised pt of last note regarding cysto, pt stated he did not think the problem was in his bladder. Tried to schedule appt for F/U, but next appt was in the 2nd week of December and pt did not want to wait that long. Pt stated he would try to find another Urologist.

## 2018-12-05 ENCOUNTER — OFFICE VISIT (OUTPATIENT)
Dept: UROLOGY | Facility: CLINIC | Age: 33
End: 2018-12-05
Payer: OTHER GOVERNMENT

## 2018-12-05 VITALS
HEIGHT: 71 IN | HEART RATE: 76 BPM | BODY MASS INDEX: 27.84 KG/M2 | SYSTOLIC BLOOD PRESSURE: 128 MMHG | WEIGHT: 198.88 LBS | DIASTOLIC BLOOD PRESSURE: 85 MMHG

## 2018-12-05 DIAGNOSIS — N53.12 PAIN WITH EJACULATION: ICD-10-CM

## 2018-12-05 DIAGNOSIS — R30.0 DYSURIA: ICD-10-CM

## 2018-12-05 DIAGNOSIS — N50.812 PAIN IN LEFT TESTICLE: Primary | ICD-10-CM

## 2018-12-05 PROCEDURE — 81002 URINALYSIS NONAUTO W/O SCOPE: CPT | Mod: PBBFAC | Performed by: NURSE PRACTITIONER

## 2018-12-05 PROCEDURE — 87086 URINE CULTURE/COLONY COUNT: CPT

## 2018-12-05 PROCEDURE — 99214 OFFICE O/P EST MOD 30 MIN: CPT | Mod: S$PBB,,, | Performed by: NURSE PRACTITIONER

## 2018-12-05 PROCEDURE — 99213 OFFICE O/P EST LOW 20 MIN: CPT | Mod: PBBFAC | Performed by: NURSE PRACTITIONER

## 2018-12-05 PROCEDURE — 87102 FUNGUS ISOLATION CULTURE: CPT

## 2018-12-05 PROCEDURE — 99999 PR PBB SHADOW E&M-EST. PATIENT-LVL III: CPT | Mod: PBBFAC,,, | Performed by: NURSE PRACTITIONER

## 2018-12-05 NOTE — PROGRESS NOTES
Subjective:       Patient ID: Junito Tian is a 32 y.o. male.    Chief Complaint: Testicle Pain (constant dull pain for months now)      HPI: Junito Tian is a 32 y.o. Black or  male who presents today for evaluation and management of left testicle pain. His last clinic visit was with Dr. Rangel 11/16/18 for testicle pain.    Pt reports left testicle pain, dysuria and pain with ejaculation for the past 2 months. Testicle pain is a constant dull pain. He reports his wife gets yeast infections after intercourse and is unsure if it is d/t semen being infected. He denies hematuria or flank pain. Denies scrotal swelling or penile discharge. He reports urinary frequency and nocturia x 1-2. Denies urgency or incontinence.  He has tried NSAIDS, antibiotics, and scrotal support for testicle pain with no relief.     He was initially seen at Urgent Care 9/25/18 for testicle pain and STD exposure a week prior but unsure of what STD per urgent care note. Urine culture resulted no growth and gonorrhea/chlamydia was negative. He was treated with antibiotics.  He was then seen by Dr. Rangel 10/17/18. He was prescribed Bactrim. Urine testing was negative for infection and scrotal US was normal.  He was seen again by Dr. Rangel 11/16/18 for continued testicle pain. He was prescribed Augmentin. Dr. Rangel recommended cysto if symptoms persist but patient is not interested in cysto.    Review of patient's allergies indicates:  No Known Allergies    No current outpatient medications on file.     No current facility-administered medications for this visit.        Past Medical History:   Diagnosis Date    Achilles tendon rupture     LEFT       Past Surgical History:   Procedure Laterality Date    REPAIR, TENDON, ACHILLES Left 7/30/2013    Performed by [facility]sten [facility] at [facility]       Family History   Problem Relation Age of Onset    Blindness Neg Hx     Glaucoma Neg Hx     Macular  degeneration Neg Hx     Retinal detachment Neg Hx        Review of Systems   Constitutional: Negative for chills and fever.   HENT: Negative for congestion.    Eyes: Negative for discharge.   Respiratory: Negative for chest tightness and shortness of breath.    Cardiovascular: Negative for chest pain and palpitations.   Gastrointestinal: Negative for nausea and vomiting.   Genitourinary: Positive for dysuria, frequency and testicular pain. Negative for decreased urine volume, difficulty urinating, discharge, flank pain, hematuria, penile pain, penile swelling, scrotal swelling and urgency.   Musculoskeletal: Negative for gait problem.   Skin: Negative for rash.   Allergic/Immunologic: Negative for immunocompromised state.   Neurological: Negative for seizures and headaches.   Hematological: Negative for adenopathy.   Psychiatric/Behavioral: Negative for confusion.         All other systems were reviewed and were negative.    Objective:     Vitals:    12/05/18 1630   BP: 128/85   Pulse: 76        Physical Exam   Nursing note and vitals reviewed.  Constitutional: He is oriented to person, place, and time. He appears well-developed and well-nourished. No distress.   HENT:   Head: Normocephalic.   Neck: Normal range of motion.   Cardiovascular: Normal rate and regular rhythm.    Pulmonary/Chest: Effort normal. No respiratory distress.   Abdominal: Soft. He exhibits no distension.   Genitourinary: Right testis shows no mass, no swelling and no tenderness. Left testis shows tenderness. Left testis shows no mass and no swelling. Circumcised. No penile erythema or penile tenderness. No discharge found.       Musculoskeletal: Normal range of motion.   Neurological: He is alert and oriented to person, place, and time.   Skin: Skin is warm and dry.     Psychiatric: He has a normal mood and affect. His behavior is normal. Judgment and thought content normal.         Lab Results   Component Value Date    CREATININE 1.3  07/02/2018     Lab Results   Component Value Date    EGFRNONAA >60 07/02/2018     Lab Results   Component Value Date    ESTGFRAFRICA >60 07/02/2018     POCT UA: sp grav 1.010, pH 7, trace protein, otherwise negative    Assessment:       1. Pain in left testicle    2. Pain with ejaculation    3. Dysuria        Plan:     Junito was seen today for testicle pain.    Diagnoses and all orders for this visit:    Pain in left testicle  -     POCT urinalysis, dipstick or tablet reag  -     Urine culture  -     CULTURE, FUNGUS    Pain with ejaculation  -     CULTURE, FUNGUS    Dysuria      -Urine specimen sent for urine culture and fungal culture. Will treat based on results  -Continue good scrotal support and NSAIDS BID with meals for 30 days for testicle pain. If pain continues, will refer to pain management.  -Recommended cysto for continued pain with ejaculation and dysuria. Pt will f/u with Dr. Rangel in [place] to schedule cysto  -Good water intake 2-3 liters per day. Avoid bladder irritants.  -RTC worsening of symptoms       I spent 25 minutes with the patient of which more than half was spent in coordinating the patient's care as well as in direct consultation with the patient in regards to our treatment and plan.

## 2018-12-07 LAB — BACTERIA UR CULT: NO GROWTH

## 2018-12-11 ENCOUNTER — TELEPHONE (OUTPATIENT)
Dept: UROLOGY | Facility: CLINIC | Age: 33
End: 2018-12-11

## 2018-12-11 NOTE — TELEPHONE ENCOUNTER
----- Message from Mey Woodson sent at 12/11/2018  3:47 PM CST -----  Contact: PT  PT calling requesting a recommendation concerns.       CALLBACK: [phone]

## 2018-12-28 ENCOUNTER — TELEPHONE (OUTPATIENT)
Dept: PLASTIC SURGERY | Facility: CLINIC | Age: 33
End: 2018-12-28

## 2018-12-28 NOTE — TELEPHONE ENCOUNTER
"12.28.18 at 3:55 I attempted to contact pt regarding an InBasket message he left.  Pt is interested in scheduling a consult.  I attempted to call pt to schedule but received a message stating "the person you are trying to reach does not have a VM set up yet."  There was no message left. I will wait for pt to call PLS office.   "

## 2019-01-07 LAB — FUNGUS SPEC CULT: NORMAL

## 2019-01-09 ENCOUNTER — OFFICE VISIT (OUTPATIENT)
Dept: PLASTIC SURGERY | Facility: CLINIC | Age: 34
End: 2019-01-09
Payer: OTHER GOVERNMENT

## 2019-01-09 VITALS
BODY MASS INDEX: 28.36 KG/M2 | SYSTOLIC BLOOD PRESSURE: 136 MMHG | WEIGHT: 203.38 LBS | HEART RATE: 68 BPM | DIASTOLIC BLOOD PRESSURE: 78 MMHG

## 2019-01-09 DIAGNOSIS — N62 GYNECOMASTIA, MALE: Primary | ICD-10-CM

## 2019-01-09 PROCEDURE — 99241 PR OFFICE CONSULT, COSMETIC: CPT | Mod: CSM,,, | Performed by: SURGERY

## 2019-01-09 PROCEDURE — 99212 OFFICE O/P EST SF 10 MIN: CPT | Mod: PBBFAC,PO | Performed by: SURGERY

## 2019-01-09 PROCEDURE — 99999 PR PBB SHADOW E&M-EST. PATIENT-LVL II: ICD-10-PCS | Mod: PBBFAC,,, | Performed by: SURGERY

## 2019-01-09 PROCEDURE — 99241 PR OFFICE CONSULT, COSMETIC: ICD-10-PCS | Mod: CSM,,, | Performed by: SURGERY

## 2019-01-09 PROCEDURE — 99999 PR PBB SHADOW E&M-EST. PATIENT-LVL II: CPT | Mod: PBBFAC,,, | Performed by: SURGERY

## 2019-01-09 NOTE — PROGRESS NOTES
Subjective:      Junito Tian is a 33 y.o. year old male who [facility] on 01/09/2019 for concerns of gynecomastia. He states that he has noticed an increase in the size of his breasts over the past year. He denies any tenderness or drainage. He is active in the [employer] and he has not noticed any limitations in his fitness or work due to these concerns. He does not have to wear any supportive wear. Denies fever, chills, nausea, vomiting, or other systemic signs of infection. He has no medical issues. Only takes daily vitamins.     Vitals:    01/09/19 0911   BP: 136/78   Pulse: 68        Review of patient's allergies indicates:  No Known Allergies      Patient Active Problem List   Diagnosis    Achilles tendon rupture    Ankle pain, left    Left leg weakness    Bilateral foot pain    Chronic follicular conjunctivitis of both eyes    Pain around both eyes    Episcleritis of both eyes    Other headache syndrome         Social History     Socioeconomic History    Marital status: Single     Spouse name: Not on file    Number of children: Not on file    Years of education: Not on file    Highest education level: Not on file   Social Needs    Financial resource strain: Not on file    Food insecurity - worry: Not on file    Food insecurity - inability: Not on file    Transportation needs - medical: Not on file    Transportation needs - non-medical: Not on file   Occupational History    Not on file   Tobacco Use    Smoking status: Current Some Day Smoker    Smokeless tobacco: Never Used    Tobacco comment: CIGARS   Substance and Sexual Activity    Alcohol use: Yes     Comment: SOCIAL    Drug use: No    Sexual activity: Not on file   Other Topics Concern    Not on file   Social History Narrative    Not on file           Review of Systems:   Constitutional: negative for fevers  Eyes: negative visual changes  ENT: negative for hearing loss  Respiratory: negative  for dyspnea  Cardiovascular: negative for chest pain  Gastrointestinal: negative for abdominal pain  Genitourinary: negative for dysuria  Neurological: negative for headaches  Behavioral/Psych: negative for hallucinations  Endocrine: negative for temperature intolerance    Objective:     Physical Exam:  Vitals:    01/09/19 0911   BP: 136/78   Pulse: 68       WD WN NAD  VSS  Normal resp effort    Breasts appear symmetric  No TTP of bl breasts  No drainage from nipple  Small breast buds are palpable in bilateral breasts       Plan:   33 y.o. male with bilateral gynecomastia  - Patient interested in surgery  - Given quotes today in clinic  - Will call clinic to make appointment once he finds adequate time for surgery  - He is active [employer] and was educated on post-op restrictions regarding lifting and exercising    Discussed subQ mastectomy through an infra-areolar incision.  Risks including loss of sensation to the nipples and nipple loss as well as contour irregularities discussed.  Pt quoted.

## 2019-01-09 NOTE — LETTER
[facility]  [address]  [facility] [address]  Phone: [phone]  Fax: [phone] January 14, 2019      Barbara L. Morgan Schoen, [facility]  [address] [facility] [address]    Patient: Junito Tian   MR Number: [id number]   YOB: 1985   Date of Visit: 1/9/2019     Dear Ms. Schoen:    Thank you for referring Junito Tian to me for evaluation. Below are the relevant portions of my assessment and plan of care.    Mr. Tian is a 33-year-old male who presents to the [facility] on 01/09/2019 for concerns of gynecomastia. He states that he has noticed an increase in the size of his breasts over the past year.     He denies any tenderness or drainage. He is active in the [employer] and he has not noticed any limitations in his fitness or work due to these concerns. He does not have to wear any supportive wear. The patient denies fever, chills, nausea, vomiting, or other systemic signs of infection. He has no medical issues. He only takes daily vitamins.     If you have questions, please do not hesitate to call me. I look forward to following Junito along with you.    Sincerely,      Emil Agustin MD  Section of Plastic Surgery  Department of Surgery  [facility]

## 2019-01-15 ENCOUNTER — OFFICE VISIT (OUTPATIENT)
Dept: OPHTHALMOLOGY | Facility: CLINIC | Age: 34
End: 2019-01-15
Payer: OTHER GOVERNMENT

## 2019-01-15 DIAGNOSIS — S00.211A EYELID ABRASION, RIGHT, INITIAL ENCOUNTER: ICD-10-CM

## 2019-01-15 DIAGNOSIS — H04.123 DRY EYES: Primary | ICD-10-CM

## 2019-01-15 PROCEDURE — 92014 PR EYE EXAM, EST PATIENT,COMPREHESV: ICD-10-PCS | Mod: S$PBB,,, | Performed by: OPHTHALMOLOGY

## 2019-01-15 PROCEDURE — 99212 OFFICE O/P EST SF 10 MIN: CPT | Mod: PBBFAC | Performed by: OPHTHALMOLOGY

## 2019-01-15 PROCEDURE — 92014 COMPRE OPH EXAM EST PT 1/>: CPT | Mod: S$PBB,,, | Performed by: OPHTHALMOLOGY

## 2019-01-15 PROCEDURE — 99999 PR PBB SHADOW E&M-EST. PATIENT-LVL II: ICD-10-PCS | Mod: PBBFAC,,, | Performed by: OPHTHALMOLOGY

## 2019-01-15 PROCEDURE — 99999 PR PBB SHADOW E&M-EST. PATIENT-LVL II: CPT | Mod: PBBFAC,,, | Performed by: OPHTHALMOLOGY

## 2019-01-15 RX ORDER — NEOMYCIN SULFATE, POLYMYXIN B SULFATE, AND DEXAMETHASONE 3.5; 10000; 1 MG/G; [USP'U]/G; MG/G
OINTMENT OPHTHALMIC NIGHTLY
Qty: 1 TUBE | Refills: 1 | Status: SHIPPED | OUTPATIENT
Start: 2019-01-15 | End: 2019-01-22

## 2019-01-15 NOTE — PROGRESS NOTES
HPI     DLS 12/14/17  Started having stinging pain 2 weeks ago right lid was swollen.francis a   lot from stinging. No drops  H/o episcleritis         Follicular conjunctivitis  No contact lens wear  Did not use restasis because of burning, no AT  Blurred vision at night      Last edited by Supriya Pompa on 1/15/2019  9:22 AM. (History)            Assessment /Plan     For exam results, see Encounter Report.    Dry eyes    Eyelid abrasion, right, initial encounter  -     neomycin-polymyxin-dexamethasone (DEXACINE) 3.5 mg/g-10,000 unit/g-0.1 % Oint; Place into the right eye every evening. for 7 days  Dispense: 1 Tube; Refill: 1      Patient is a pleasant 33-year-old male who I have seen in the past couple years prior.  Patient complains of irritation and blurry vision in the evening.  Patient feels that he has to blink multiple times to help clear the vision.    He has bilateral mild papillary reaction and some trace inferior SPK in both eyes. He also has some mild blepharitis.    Recommend starting warm compresses, lid hygiene, and artificial tears.    Discussed the possibility of punctal plug placement should he not noticed any improvement in a month.    Apply Maxitrol ointment to right upper eyelid abrasion nightly for 1 week.    Return in 1 month sooner any worsening

## 2019-03-05 ENCOUNTER — CLINICAL SUPPORT (OUTPATIENT)
Dept: URGENT CARE | Facility: CLINIC | Age: 34
End: 2019-03-05
Payer: OTHER GOVERNMENT

## 2019-03-05 VITALS
HEIGHT: 71 IN | RESPIRATION RATE: 16 BRPM | OXYGEN SATURATION: 97 % | HEART RATE: 86 BPM | TEMPERATURE: 98 F | BODY MASS INDEX: 28.42 KG/M2 | DIASTOLIC BLOOD PRESSURE: 84 MMHG | WEIGHT: 203 LBS | SYSTOLIC BLOOD PRESSURE: 132 MMHG

## 2019-03-05 DIAGNOSIS — N50.89 SCROTAL SWELLING: Primary | Chronic | ICD-10-CM

## 2019-03-05 PROCEDURE — 99214 OFFICE O/P EST MOD 30 MIN: CPT | Mod: 25,S$GLB,, | Performed by: NURSE PRACTITIONER

## 2019-03-05 PROCEDURE — 96372 PR INJECTION,THERAP/PROPH/DIAG2ST, IM OR SUBCUT: ICD-10-PCS | Mod: S$GLB,,, | Performed by: NURSE PRACTITIONER

## 2019-03-05 PROCEDURE — 96372 THER/PROPH/DIAG INJ SC/IM: CPT | Mod: S$GLB,,, | Performed by: NURSE PRACTITIONER

## 2019-03-05 PROCEDURE — 99214 PR OFFICE/OUTPT VISIT, EST, LEVL IV, 30-39 MIN: ICD-10-PCS | Mod: 25,S$GLB,, | Performed by: NURSE PRACTITIONER

## 2019-03-05 RX ORDER — CEFTRIAXONE 250 MG/1
250 INJECTION, POWDER, FOR SOLUTION INTRAMUSCULAR; INTRAVENOUS
Status: COMPLETED | OUTPATIENT
Start: 2019-03-05 | End: 2019-03-05

## 2019-03-05 RX ORDER — LEVOFLOXACIN 500 MG/1
500 TABLET, FILM COATED ORAL DAILY
Qty: 10 TABLET | Refills: 0 | Status: SHIPPED | OUTPATIENT
Start: 2019-03-05 | End: 2019-03-15

## 2019-03-05 RX ADMIN — CEFTRIAXONE 250 MG: 250 INJECTION, POWDER, FOR SOLUTION INTRAMUSCULAR; INTRAVENOUS at 04:03

## 2019-03-05 NOTE — PROGRESS NOTES
"Subjective:       Patient ID: Junito Tian is a 33 y.o. male.    Vitals:  height is 5' 11" (1.803 m) and weight is 92.1 kg (203 lb). His oral temperature is 97.6 °F (36.4 °C). His blood pressure is 132/84 and his pulse is 86. His respiration is 16 and oxygen saturation is 97%.     Chief Complaint: Testicle Pain (Lt)    C/O Lt testicle pain, back pain X 1 year      Testicle Pain   The patient's primary symptoms include scrotal swelling and testicular pain. This is a chronic problem. The current episode started more than 1 year ago. The problem occurs constantly. The problem has been unchanged. Pertinent negatives include no chest pain, chills, coughing, diarrhea, dysuria, fever, frequency, headaches, nausea, rash, shortness of breath, sore throat, urgency or vomiting.       Constitution: Negative for chills, fatigue and fever.   HENT: Negative for congestion and sore throat.    Neck: Negative for painful lymph nodes.   Cardiovascular: Negative for chest pain and leg swelling.   Eyes: Negative for double vision and blurred vision.   Respiratory: Negative for cough and shortness of breath.    Gastrointestinal: Negative for nausea, vomiting and diarrhea.   Genitourinary: Positive for scrotal swelling and testicular pain. Negative for dysuria, frequency and urgency.   Musculoskeletal: Negative for joint pain, joint swelling, muscle cramps and muscle ache.   Skin: Negative for color change, pale and rash.   Allergic/Immunologic: Negative for seasonal allergies.   Neurological: Negative for dizziness, history of vertigo, light-headedness, passing out and headaches.   Hematologic/Lymphatic: Negative for swollen lymph nodes, easy bruising/bleeding and history of blood clots. Does not bruise/bleed easily.   Psychiatric/Behavioral: Negative for nervous/anxious, sleep disturbance and depression. The patient is not nervous/anxious.        Objective:      Physical Exam   Constitutional: He is oriented to person, place, " and time. He appears well-developed and well-nourished. He is cooperative.  Non-toxic appearance. He does not appear ill. No distress.   HENT:   Head: Normocephalic and atraumatic.   Right Ear: Hearing, tympanic membrane, external ear and ear canal normal.   Left Ear: Hearing, tympanic membrane, external ear and ear canal normal.   Nose: Nose normal. No mucosal edema, rhinorrhea or nasal deformity. No epistaxis. Right sinus exhibits no maxillary sinus tenderness and no frontal sinus tenderness. Left sinus exhibits no maxillary sinus tenderness and no frontal sinus tenderness.   Mouth/Throat: Uvula is midline, oropharynx is clear and moist and mucous membranes are normal. No trismus in the jaw. Normal dentition. No uvula swelling. No posterior oropharyngeal erythema.   Eyes: Conjunctivae and lids are normal. Right eye exhibits no discharge. Left eye exhibits no discharge. No scleral icterus.   Sclera clear bilat   Neck: Trachea normal, normal range of motion, full passive range of motion without pain and phonation normal. Neck supple.   Cardiovascular: Normal rate, regular rhythm, normal heart sounds, intact distal pulses and normal pulses.   Pulmonary/Chest: Effort normal and breath sounds normal. No respiratory distress.   Abdominal: Soft. Normal appearance and bowel sounds are normal. He exhibits no distension, no pulsatile midline mass and no mass. There is no tenderness.   Genitourinary: Penis normal. Cremasteric reflex is present. Left testis shows swelling. Circumcised.         Genitourinary Comments: Left testicle markedly more lower than right   Musculoskeletal: Normal range of motion. He exhibits no edema or deformity.   Neurological: He is alert and oriented to person, place, and time. He exhibits normal muscle tone. Coordination normal.   Skin: Skin is warm, dry and intact. He is not diaphoretic. No pallor.   Psychiatric: He has a normal mood and affect. His speech is normal and behavior is normal.  Judgment and thought content normal. Cognition and memory are normal.   Nursing note and vitals reviewed.      Assessment:       1. Scrotal swelling        Plan:     pt has been evaluated and treated several times for testicle pain with no relief. Seen at [facility] and treated for prostatitis x 2 months, states that was relieved but continues to have left testicle/scrotal pain. Discussed possibility of epididymitis, which he agreed could be the case and he has not been treated for in the past. Researched options on uptodate, will treat with rocephin and levaquin. Has follow up appt with [facility] urology in one month    Scrotal swelling  Comments:  left     Other orders  -     cefTRIAXone injection 250 mg  -     levoFLOXacin (LEVAQUIN) 500 MG tablet; Take 1 tablet (500 mg total) by mouth once daily. for 10 days  Dispense: 10 tablet; Refill: 0

## 2019-03-05 NOTE — PATIENT INSTRUCTIONS
What are Epididymitis and Orchitis?    The epididymis is a coiled tube. It is part of the male reproductive system. A mans two testicles sit inside the scrotum. One epididymis sits behind each testicle. Epididymitis is inflammation (swelling and irritation) of this tube. Orchitis occurs when the inflammation spreads to the testicle.  Normal flow of sperm and urine  Sperm are made in the testicles. Sperm travel from the testicles through the epididymis. They flow into a tube called the vas deferens. During ejaculation, sperm pass from the vas deferens through the urethra out of the body. During urination, urine flows from the bladder through the urethra out of the body.  How the problem starts  The problem is thought to be due to bacteria. If bacteria get into the urethra, they may cause an infection. Infection can then travel up the urethra into the epididymis. This leads to inflammation. Pain and swelling can then spread to the testicle. This is called orchitis. In rare cases, orchitis can be due to an infection with the mumps virus. There are two kinds of inflammation:  · Acute inflammation comes on quickly. Symptoms may include:  ¨ Pain and swelling in the scrotum  ¨ Frequent urge to urinate  ¨ Discharge from the penis  ¨ Pain during ejaculation  ¨ Fever  · Chronic inflammation is most often the late phase of an acute infection. Symptoms may include:  ¨ An ache or dull pain in the scrotum, which may spread to the groin  ¨ A heavy feeling in the scrotum  Date Last Reviewed: 1/1/2017  © 8116-3117 [employer]. [address] [place] [address], [place] [address]. All rights reserved. This information is not intended as a substitute for professional medical care. Always follow your healthcare professional's instructions.

## 2019-03-11 ENCOUNTER — HOSPITAL ENCOUNTER (EMERGENCY)
Facility: HOSPITAL | Age: 34
Discharge: HOME | End: 2019-03-11
Attending: EMERGENCY MEDICINE
Payer: OTHER GOVERNMENT

## 2019-03-11 ENCOUNTER — CLINICAL SUPPORT (OUTPATIENT)
Dept: URGENT CARE | Facility: CLINIC | Age: 34
End: 2019-03-11
Payer: OTHER GOVERNMENT

## 2019-03-11 VITALS
DIASTOLIC BLOOD PRESSURE: 79 MMHG | OXYGEN SATURATION: 99 % | TEMPERATURE: 99 F | HEIGHT: 71 IN | SYSTOLIC BLOOD PRESSURE: 127 MMHG | WEIGHT: 200 LBS | HEART RATE: 78 BPM | BODY MASS INDEX: 28 KG/M2 | RESPIRATION RATE: 18 BRPM

## 2019-03-11 VITALS
RESPIRATION RATE: 16 BRPM | DIASTOLIC BLOOD PRESSURE: 79 MMHG | BODY MASS INDEX: 28.42 KG/M2 | WEIGHT: 203 LBS | TEMPERATURE: 97 F | SYSTOLIC BLOOD PRESSURE: 125 MMHG | HEIGHT: 71 IN | OXYGEN SATURATION: 98 % | HEART RATE: 79 BPM

## 2019-03-11 DIAGNOSIS — N41.1 CHRONIC PROSTATITIS: ICD-10-CM

## 2019-03-11 DIAGNOSIS — R19.8 RECTAL PRESSURE: Primary | ICD-10-CM

## 2019-03-11 DIAGNOSIS — N41.1 CHRONIC PROSTATITIS: Primary | ICD-10-CM

## 2019-03-11 LAB
BILIRUB UR QL STRIP: NEGATIVE
GLUCOSE UR QL STRIP: NEGATIVE
KETONES UR QL STRIP: NEGATIVE
LEUKOCYTE ESTERASE UR QL STRIP: NEGATIVE
PH, POC UA: 5.5
POC BLOOD, URINE: NEGATIVE
POC NITRATES, URINE: NEGATIVE
PROT UR QL STRIP: NEGATIVE
SP GR UR STRIP: 1.02 (ref 1–1.03)
UROBILINOGEN UR STRIP-ACNC: NORMAL (ref 0.3–2.2)

## 2019-03-11 PROCEDURE — 99214 OFFICE O/P EST MOD 30 MIN: CPT | Mod: 25,S$GLB,, | Performed by: NURSE PRACTITIONER

## 2019-03-11 PROCEDURE — 63600175 PHARM REV CODE 636 W HCPCS: Performed by: EMERGENCY MEDICINE

## 2019-03-11 PROCEDURE — 96372 THER/PROPH/DIAG INJ SC/IM: CPT

## 2019-03-11 PROCEDURE — 99284 EMERGENCY DEPT VISIT MOD MDM: CPT | Mod: 25

## 2019-03-11 PROCEDURE — 81003 URINALYSIS AUTO W/O SCOPE: CPT | Mod: QW,S$GLB,, | Performed by: NURSE PRACTITIONER

## 2019-03-11 PROCEDURE — 81003 POCT URINALYSIS, DIPSTICK, AUTOMATED, W/O SCOPE: ICD-10-PCS | Mod: QW,S$GLB,, | Performed by: NURSE PRACTITIONER

## 2019-03-11 PROCEDURE — 99214 PR OFFICE/OUTPT VISIT, EST, LEVL IV, 30-39 MIN: ICD-10-PCS | Mod: 25,S$GLB,, | Performed by: NURSE PRACTITIONER

## 2019-03-11 RX ORDER — AMIKACIN SULFATE 250 MG/ML
250 INJECTION, SOLUTION INTRAMUSCULAR; INTRAVENOUS ONCE
Status: COMPLETED | OUTPATIENT
Start: 2019-03-12 | End: 2019-03-11

## 2019-03-11 RX ADMIN — AMIKACIN SULFATE 250 MG: 500 INJECTION, SOLUTION INTRAMUSCULAR; INTRAVENOUS at 10:03

## 2019-03-11 NOTE — PATIENT INSTRUCTIONS
Prostatitis    The prostate gland is located deep inside the body at the base of the bladder. Prostatitis is an inflammation of the prostate gland. This can occur with or without infection. Most cases of prostatitis are long term (chronic). Most do not involve a bacterial infection.  · Chronic prostatitis is more common in older men. It is usually an inflammatory condition and not an infection. But, bacterial infection can also cause chronic prostatitis. It can cause pain in the rectum, urethra, bladder, or scrotum. It can also make you unable to fully empty the bladder.  You may urinate often, or have burning with urination. Prostatitis may also cause painful ejaculation and erectile dysfunction.  · Sudden onset (acute) prostatitis usually occurs in men younger than 35. It is from a bacterial infection. You may have severe symptoms such as fever, chills, muscle aches, and pain in the area between the scrotum and anus (perineum). You may have a hard time urinating, or have pain or burning when urinating. There may be blood or pus in the urine.  Your healthcare provider may do a culture test on prostate fluids or discharge from the penis. This will help determine if bacteria are the cause. Treatment can include antibiotics, anti-inflammatory medicine, prostate medicines, and stool softeners.  Home care  These guidelines will help you care for yourself at home:  · Rest at home until the fever is gone and you are feeling better.  · A hot sitz bath may offer some relief. Fill a tub with 6 inches of hot water. Allow the water to run so you can keep it hot for 10 to 15 minutes.  · Drink plenty of fluids. Do not drink alcohol or caffeine until all symptoms are gone.  · If your healthcare gives you an antibiotic, take it exactly as you are told. Take it until it is all gone.  · Constipation causes straining and pain. Avoid constipation by eating natural laxatives such as prunes, fresh fruits, and whole-grain cereals. If  needed, use a mild over-the-counter (OTC) laxative for constipation. An OTC stool softener may be used to keep the stools soft.  · If sex is uncomfortable or painful, avoid until symptoms get better.  · You may use OTC medicines for pain and fever, unless another medicine was given. If you have chronic liver or kidney disease, talk with your healthcare provider before using these medicines. Also talk with your provider if you've ever had a stomach ulcer or GI bleeding.  Follow-up care  Follow up with your healthcare provider, a urologist, or as advised to be sure you are responding to treatment. Your healthcare provider may want to see you after you finish your antibiotics to be sure the infection has cleared. If a culture was taken, you may call for the results as directed. A culture test can help your healthcare provider know if you are on the correct antibiotic.  Call 911  Call 911 if any of these occur:  · Weakness, dizziness, or fainting  When to seek medical advice  Call your healthcare provider right away if any of these occur:  · Fever of 100.4°F (38°C) or higher after 3 days of treatment, or as advised  · Unable to pass urine for 8 hours  · Pressure or pain in your bladder gets worse  · Painful swelling of the testicle or scrotum  Date Last Reviewed: 10/1/2016  © 8540-6274 [employer]. [address] [place] [address], [place] [address]. All rights reserved. This information is not intended as a substitute for professional medical care. Always follow your healthcare professional's instructions.

## 2019-03-11 NOTE — PROGRESS NOTES
"Subjective:       Patient ID: Junito Tian is a 33 y.o. male.    Vitals:  height is 5' 11" (1.803 m) and weight is 92.1 kg (203 lb). His temperature is 97.3 °F (36.3 °C). His blood pressure is 125/79 and his pulse is 79. His respiration is 16 and oxygen saturation is 98%.     Chief Complaint: Annual Exam    Pt presents with rectal pressure x 1 week. Pt has h/o chronic prostatitis. He is currently under the care of [facility] urologist. He is here requesting to be tx with IM injection of abx rx previously by his urologist. He attempted to see his urologist but was told next appt was in 1 month. Pt denies f/c/n/v. Pt was recently tx at our clinic with IM rocephin and levaquin. Pt states he completed the levaquin rx. He started doxycycline previously rx by his urologist for the past week. He is not taking the bactrim that was rx by urologist and states "it doesn't work". He denies constipation, diarrhea or rectal bleeding. He denies dysuria, urgency or frequency or hematuria. He denies painful ejaculation.        Constitution: Negative for chills and fever.   Neck: Negative for painful lymph nodes.   Gastrointestinal: Negative for abdominal pain, nausea and vomiting.   Genitourinary: Negative for dysuria, frequency, urgency, urine decreased, hematuria, history of kidney stones, genital trauma, painful intercourse, genital sore, penile discharge, painful ejaculation, penile pain, penile swelling, scrotal swelling and testicular pain.        Rectal pressure   Musculoskeletal: Negative for back pain.   Skin: Negative for rash and lesion.   Hematologic/Lymphatic: Negative for swollen lymph nodes.       Objective:      Physical Exam   Constitutional: He is oriented to person, place, and time. He appears well-developed and well-nourished. He is cooperative.  Non-toxic appearance. He does not appear ill. No distress.   HENT:   Head: Normocephalic and atraumatic.   Right Ear: Hearing, tympanic membrane, external ear and " "ear canal normal.   Left Ear: Hearing, tympanic membrane, external ear and ear canal normal.   Nose: Nose normal. No mucosal edema, rhinorrhea or nasal deformity. No epistaxis. Right sinus exhibits no maxillary sinus tenderness and no frontal sinus tenderness. Left sinus exhibits no maxillary sinus tenderness and no frontal sinus tenderness.   Mouth/Throat: Uvula is midline, oropharynx is clear and moist and mucous membranes are normal. No trismus in the jaw. Normal dentition. No uvula swelling. No posterior oropharyngeal erythema.   Eyes: Conjunctivae and lids are normal. Right eye exhibits no discharge. Left eye exhibits no discharge. No scleral icterus.   Sclera clear bilat   Neck: Trachea normal, normal range of motion, full passive range of motion without pain and phonation normal. Neck supple.   Cardiovascular: Normal rate, regular rhythm, normal heart sounds, intact distal pulses and normal pulses.   Pulmonary/Chest: Effort normal and breath sounds normal. No respiratory distress.   Abdominal: Soft. Normal appearance and bowel sounds are normal. He exhibits no distension, no pulsatile midline mass and no mass. There is no tenderness.   Genitourinary:   Genitourinary Comments: Pt decline genital and rectal exam - stated "I don't need that".   Musculoskeletal: Normal range of motion. He exhibits no edema or deformity.   Neurological: He is alert and oriented to person, place, and time. He exhibits normal muscle tone. Coordination normal.   Skin: Skin is warm, dry and intact. He is not diaphoretic. No pallor.   Psychiatric: He has a normal mood and affect. His speech is normal and behavior is normal. Judgment and thought content normal. Cognition and memory are normal.   Nursing note and vitals reviewed.      Assessment:       1. Rectal pressure    2. Chronic prostatitis        Plan:         Rectal pressure  -     POCT Urinalysis, Dipstick, Automated, W/O Scope    Chronic prostatitis    pt advised to cont " doxycycline and see urologist tomorrow

## 2019-03-12 NOTE — ED NOTES
Presents to the ER with c/o rectal pain and prostitis. Patient reports that he has an appointment with a urologist, but it is one month from now. Patient has been placed on several antibiotics without any relief. Denies any fever and is afebrile upon arrival to the ED. Mucous membranes are pink and moist. Skin is warm, dry and intact. Lungs are clear bilaterally, respirations are regular and unlabored. Denies cough, congestion, rhinorrhea or SOB. BS active x4, no tenderness with palpation, abd is soft and not distended. Denies any appetite or activity change. S1S2, capillary refill is < 2 seconds. Denies dysuria, difficulty urinating, frequency, numbness, tingling or weakness. CARLOS ALBERTO MINAYA

## 2019-03-12 NOTE — ED PROVIDER NOTES
Encounter Date: 3/11/2019    SCRIBE #1 NOTE: I, Adri Gomez, am scribing for, and in the presence of, Dr. Lyons.       History     Chief Complaint   Patient presents with    Rectal pain with prostitis     ongoing for months       Time seen by provider: 10:37 PM on 03/11/2019    Junito Tian is a 33 y.o. male with who presents to the ED with rectal pain. Patient states he has an appointment with a urologist 1 month from now and was told by that urologist to receive a treatment of amikacin 250 mg once and bactrim for 28 days. He reports that he finished the bactrim without relief and was recently put on Levaquin by urgent care. Patient denies any fever, abdominal pain, vomiting, or any worsening urinary symptoms. Patient states he tried to get the amikacin injection from the urgent care but they did not carry that antibiotic, thus he came to the ED.       The history is provided by the patient. No  was used.     Review of patient's allergies indicates:  No Known Allergies  Past Medical History:   Diagnosis Date    Achilles tendon rupture     LEFT     Past Surgical History:   Procedure Laterality Date    REPAIR, TENDON, ACHILLES Left 7/30/2013    Performed by Torsten Prince MD at [facility] OR     Family History   Problem Relation Age of Onset    Blindness Neg Hx     Glaucoma Neg Hx     Macular degeneration Neg Hx     Retinal detachment Neg Hx      Social History     Tobacco Use    Smoking status: Current Some Day Smoker    Smokeless tobacco: Never Used    Tobacco comment: CIGARS   Substance Use Topics    Alcohol use: Yes     Comment: SOCIAL    Drug use: No     Review of Systems   Constitutional: Negative for chills and fever.   HENT: Negative for congestion, drooling and sore throat.    Eyes: Negative for photophobia and visual disturbance.   Respiratory: Negative for cough, shortness of breath and wheezing.    Cardiovascular: Negative for chest pain.   Gastrointestinal:  Negative for abdominal pain, diarrhea, nausea and vomiting.   Genitourinary: Negative for dysuria and hematuria.        +rectal/prostate pain   Musculoskeletal: Negative for joint swelling.   Skin: Negative for rash.   Neurological: Negative for syncope, weakness and numbness.   Hematological: Does not bruise/bleed easily.   Psychiatric/Behavioral: Negative for confusion.       Physical Exam     Initial Vitals [03/11/19 2219]   BP Pulse Resp Temp SpO2   127/79 78 18 98.6 °F (37 °C) 99 %      MAP       --         Physical Exam    Nursing note and vitals reviewed.  Constitutional: He appears well-nourished.   HENT:   Head: Normocephalic and atraumatic.   Eyes: Conjunctivae and EOM are normal.   Neck: Normal range of motion. Neck supple. No thyroid mass present.   Cardiovascular: Normal rate, regular rhythm and normal heart sounds. Exam reveals no gallop and no friction rub.    No murmur heard.  Pulmonary/Chest: Breath sounds normal. He has no wheezes. He has no rhonchi. He has no rales.   Abdominal: Soft. Normal appearance and bowel sounds are normal. There is no tenderness. There is no rebound and no guarding.   Neurological: He is alert and oriented to person, place, and time. He has normal strength. No cranial nerve deficit or sensory deficit.   Skin: Skin is warm and dry. No rash noted. No erythema.   Psychiatric: He has a normal mood and affect. His speech is normal. Cognition and memory are normal.         ED Course   Procedures  Labs Reviewed - No data to display       Imaging Results    None          Medical Decision Making:   History:   Old Medical Records: I decided to obtain old medical records.  ED Management:  This patient was interviewed and examined.  Initial vital signs stable. He reports very familiar symptoms and indicates he is only here to receive amikacin which he could not receive at the urgent care.  Patient denies an allergy to antibiotics.  Reports he has been taking all of his other  antibiotics as prescribed and is asked to follow up with his urologist at [facility] as soon as possible after receiving this injection.  Patient did receive 250 mg intramuscular amikacin and was discharged without an adverse medication reaction.            Scribe Attestation:   Scribe #1: I performed the above scribed service and the documentation accurately describes the services I performed. I attest to the accuracy of the note.    I, Dr. George Lyons, personally performed the services described in this documentation. All medical record entries made by the scribe were at my direction and in my presence.  I have reviewed the chart and agree that the record reflects my personal performance and is accurate and complete. George Lyons MD.  11:48 PM 03/11/2019           Clinical Impression:       ICD-10-CM ICD-9-CM   1. Chronic prostatitis N41.1 601.1         Disposition:   Disposition: Discharged  Condition: Stable                        George Lyons MD  03/12/19 0439

## 2019-03-12 NOTE — ED NOTES
Upon discharge, patient is AAOx4, no cardiac or respiratory complications. Follow up care reviewed with patient and has been instructed to return to the ER if needed. Patient verbalized understanding and ambulated to the lobby without difficulty. REI CARCAMO

## 2019-05-22 ENCOUNTER — TELEPHONE (OUTPATIENT)
Dept: OPHTHALMOLOGY | Facility: CLINIC | Age: 34
End: 2019-05-22

## 2019-05-22 NOTE — TELEPHONE ENCOUNTER
----- Message from Leonila Cormier sent at 5/22/2019  2:43 PM CDT -----  Contact: Junito  Needs Advice    Reason for call: pt stated he called on last week for his last 3 visit office notes. Pt stated he never received them or a call back. Please contact pt.         Communication Preference: [phone]    Additional Information: Fax [phone]

## 2019-05-31 ENCOUNTER — CLINICAL SUPPORT (OUTPATIENT)
Dept: URGENT CARE | Facility: CLINIC | Age: 34
End: 2019-05-31
Payer: OTHER GOVERNMENT

## 2019-05-31 VITALS
TEMPERATURE: 97 F | HEART RATE: 92 BPM | DIASTOLIC BLOOD PRESSURE: 87 MMHG | SYSTOLIC BLOOD PRESSURE: 137 MMHG | RESPIRATION RATE: 17 BRPM | OXYGEN SATURATION: 97 % | WEIGHT: 193.38 LBS | HEIGHT: 71 IN | BODY MASS INDEX: 27.07 KG/M2

## 2019-05-31 DIAGNOSIS — N41.1 CHRONIC PROSTATITIS: ICD-10-CM

## 2019-05-31 DIAGNOSIS — N50.812 PAIN IN LEFT TESTICLE: Primary | ICD-10-CM

## 2019-05-31 LAB
BILIRUB UR QL STRIP: NEGATIVE
GLUCOSE UR QL STRIP: NEGATIVE
KETONES UR QL STRIP: NEGATIVE
LEUKOCYTE ESTERASE UR QL STRIP: NEGATIVE
PH, POC UA: 6.5
POC BLOOD, URINE: NEGATIVE
POC NITRATES, URINE: NEGATIVE
PROT UR QL STRIP: POSITIVE
SP GR UR STRIP: 1.02 (ref 1–1.03)
UROBILINOGEN UR STRIP-ACNC: NORMAL (ref 0.3–2.2)

## 2019-05-31 PROCEDURE — 99214 OFFICE O/P EST MOD 30 MIN: CPT | Mod: 25,S$GLB,, | Performed by: NURSE PRACTITIONER

## 2019-05-31 PROCEDURE — 81003 URINALYSIS AUTO W/O SCOPE: CPT | Mod: QW,S$GLB,, | Performed by: NURSE PRACTITIONER

## 2019-05-31 PROCEDURE — 99214 PR OFFICE/OUTPT VISIT, EST, LEVL IV, 30-39 MIN: ICD-10-PCS | Mod: 25,S$GLB,, | Performed by: NURSE PRACTITIONER

## 2019-05-31 PROCEDURE — 81003 POCT URINALYSIS, DIPSTICK, AUTOMATED, W/O SCOPE: ICD-10-PCS | Mod: QW,S$GLB,, | Performed by: NURSE PRACTITIONER

## 2019-05-31 NOTE — PROGRESS NOTES
"Subjective:       Patient ID: Junito Tian is a 33 y.o. male.    Vitals:  height is 5' 11" (1.803 m) and weight is 87.7 kg (193 lb 6.4 oz). His temperature is 97.4 °F (36.3 °C). His blood pressure is 137/87 and his pulse is 92. His respiration is 17 and oxygen saturation is 97%.     Chief Complaint: Testicle Pain    Pt presents with left testicular pain x 1 week. Pt has h/o chronic left testicle pain and prostatitis. Pt was last tx March 11th. He took oral doxycycline that was rx by this urologist and also went to the ER and received IM inj of amikacin. Pt states he symptoms completely resolved after the last tx. He had intercourse with his girlfriend and states every time after he has intercourse with her he develops left testicular pain. Pt states his partner has not recently been seen or tx for any infections or STDs. Pt denies dysuria, urgency and frequency. He denies rectal pressure or pain with sitting. He denies penile discharge. He reports pain with ejaculation. He denies f/c/n/v. Pt states he has been tx at this clinic in the past with IM rocephin and oral levaquin x 7 days and his symptoms resolved. He is here today for tx with same medications. He called his urologist but states next available appt is in 1 month.     Testicle Pain   The patient's primary symptoms include testicular pain. The patient's pertinent negatives include no penile discharge, penile pain or scrotal swelling. This is a recurrent problem. Episode onset: one month. The problem occurs constantly. Pertinent negatives include no abdominal pain, chills, dysuria, fever, frequency, nausea, rash, urgency or vomiting. The testicular pain affects the left testicle. There is swelling in the left testicle.       Constitution: Negative for chills and fever.   Neck: Negative for painful lymph nodes.   Gastrointestinal: Negative for abdominal pain, nausea and vomiting.   Genitourinary: Positive for painful intercourse, painful ejaculation " and testicular pain. Negative for dysuria, frequency, urgency, urine decreased, hematuria, history of kidney stones, genital trauma, genital sore, penile discharge, penile pain, penile swelling and scrotal swelling.   Musculoskeletal: Negative for back pain.   Skin: Negative for rash and lesion.   Hematologic/Lymphatic: Negative for swollen lymph nodes.       Objective:      Physical Exam   Constitutional: He is oriented to person, place, and time. He appears well-developed and well-nourished. He is cooperative.  Non-toxic appearance. He does not appear ill. No distress.   HENT:   Head: Normocephalic and atraumatic.   Right Ear: Hearing, tympanic membrane, external ear and ear canal normal.   Left Ear: Hearing, tympanic membrane, external ear and ear canal normal.   Nose: Nose normal. No mucosal edema, rhinorrhea or nasal deformity. No epistaxis. Right sinus exhibits no maxillary sinus tenderness and no frontal sinus tenderness. Left sinus exhibits no maxillary sinus tenderness and no frontal sinus tenderness.   Mouth/Throat: Uvula is midline, oropharynx is clear and moist and mucous membranes are normal. No trismus in the jaw. Normal dentition. No uvula swelling. No posterior oropharyngeal erythema.   Eyes: Conjunctivae and lids are normal. Right eye exhibits no discharge. Left eye exhibits no discharge. No scleral icterus.   Sclera clear bilat   Neck: Trachea normal, normal range of motion, full passive range of motion without pain and phonation normal. Neck supple.   Cardiovascular: Normal rate, regular rhythm, normal heart sounds, intact distal pulses and normal pulses.   Pulmonary/Chest: Effort normal and breath sounds normal. No respiratory distress.   Abdominal: Soft. Normal appearance and bowel sounds are normal. He exhibits no distension, no pulsatile midline mass and no mass. There is no tenderness.   Genitourinary:   Genitourinary Comments: Tenderness to right prostate with mild induration. Left testicle  soft and mobile, tenderness with palpation, no groin lymphadenopathy. No penile discharge   Musculoskeletal: Normal range of motion. He exhibits no edema or deformity.   Neurological: He is alert and oriented to person, place, and time. He exhibits normal muscle tone. Coordination normal.   Skin: Skin is warm, dry and intact. He is not diaphoretic. No pallor.   Psychiatric: He has a normal mood and affect. His speech is normal and behavior is normal. Judgment and thought content normal. Cognition and memory are normal.   Nursing note and vitals reviewed.      Assessment:       1. Pain in left testicle    2. Chronic prostatitis        Plan:         Pain in left testicle  -     Culture, Urine  -     POCT Urinalysis, Dipstick, Automated, W/O Scope  -     C. trachomatis/N. gonorrhoeae by AMP DNA [facility]; Urine    Chronic prostatitis    UA negative. Pt has tenderness to left testicle and prostate on exam. Pt referred to ER for further eval, management and tx. Pt advised on importance of going to ER for further eval and risk of not going including worsening symptoms, disability and death. Pt verbalized understanding and declined EMS transport.

## 2019-06-01 LAB
C TRACH RRNA SPEC QL NAA+PROBE: NEGATIVE
N GONORRHOEA RRNA SPEC QL NAA+PROBE: NEGATIVE

## 2019-06-02 LAB
BACTERIA UR CULT: NO GROWTH
BACTERIA UR CULT: NORMAL

## 2019-06-07 ENCOUNTER — TELEPHONE (OUTPATIENT)
Dept: URGENT CARE | Facility: CLINIC | Age: 34
End: 2019-06-07

## 2019-06-07 NOTE — TELEPHONE ENCOUNTER
----- Message from LA Chamorro sent at 6/5/2019  3:23 PM CDT -----  Negative culture results, please call to notify patient and refer to PCP for further eval if continues to have symptoms

## 2020-01-21 ENCOUNTER — TELEPHONE (OUTPATIENT)
Dept: NEUROLOGY | Facility: CLINIC | Age: 35
End: 2020-01-21

## 2020-01-22 NOTE — TELEPHONE ENCOUNTER
----- Message from Demetrice Langley sent at 1/21/2020  2:51 PM CST -----  Contact: pt @[phone]  Pt called in to reschedule an appt. Please follow up.

## 2020-01-27 ENCOUNTER — OFFICE VISIT (OUTPATIENT)
Dept: NEUROLOGY | Facility: CLINIC | Age: 35
End: 2020-01-27
Payer: OTHER GOVERNMENT

## 2020-01-27 VITALS — WEIGHT: 195 LBS | HEIGHT: 71 IN | BODY MASS INDEX: 27.3 KG/M2

## 2020-01-27 DIAGNOSIS — R51.9 SINUS HEADACHE: Primary | ICD-10-CM

## 2020-01-27 PROCEDURE — 99213 PR OFFICE/OUTPT VISIT, EST, LEVL III, 20-29 MIN: ICD-10-PCS | Mod: S$PBB,,, | Performed by: PSYCHIATRY & NEUROLOGY

## 2020-01-27 PROCEDURE — 99213 OFFICE O/P EST LOW 20 MIN: CPT | Mod: PBBFAC | Performed by: PSYCHIATRY & NEUROLOGY

## 2020-01-27 PROCEDURE — 99999 PR PBB SHADOW E&M-EST. PATIENT-LVL III: ICD-10-PCS | Mod: PBBFAC,,, | Performed by: PSYCHIATRY & NEUROLOGY

## 2020-01-27 PROCEDURE — 99213 OFFICE O/P EST LOW 20 MIN: CPT | Mod: S$PBB,,, | Performed by: PSYCHIATRY & NEUROLOGY

## 2020-01-27 PROCEDURE — 99999 PR PBB SHADOW E&M-EST. PATIENT-LVL III: CPT | Mod: PBBFAC,,, | Performed by: PSYCHIATRY & NEUROLOGY

## 2020-01-27 RX ORDER — DICLOFENAC SODIUM 50 MG/1
50 TABLET, DELAYED RELEASE ORAL 3 TIMES DAILY PRN
Qty: 30 TABLET | Refills: 0 | Status: SHIPPED | OUTPATIENT
Start: 2020-01-27 | End: 2021-11-24 | Stop reason: CLARIF

## 2020-01-27 RX ORDER — GABAPENTIN 300 MG/1
CAPSULE ORAL
COMMUNITY
Start: 2019-11-08 | End: 2021-11-24 | Stop reason: CLARIF

## 2020-01-27 RX ORDER — PREDNISONE 20 MG/1
TABLET ORAL
Qty: 12 TABLET | Refills: 0 | Status: SHIPPED | OUTPATIENT
Start: 2020-01-27 | End: 2021-11-24 | Stop reason: CLARIF

## 2020-01-27 RX ORDER — FINASTERIDE 1 MG/1
1 TABLET, FILM COATED ORAL DAILY
COMMUNITY
Start: 2019-12-12 | End: 2021-11-24 | Stop reason: CLARIF

## 2020-01-27 RX ORDER — CEPHALEXIN 250 MG/1
CAPSULE ORAL
COMMUNITY
Start: 2019-11-13 | End: 2020-07-06

## 2020-01-27 RX ORDER — DOXYCYCLINE 100 MG/1
CAPSULE ORAL
COMMUNITY
Start: 2019-12-11 | End: 2020-07-29 | Stop reason: SDUPTHER

## 2020-01-27 NOTE — PATIENT INSTRUCTIONS
FOLLOW UP WITH ANTIBIOTICS PER PRIMARY CARE    TAKE PREDNISONE AS DIRECTED    TAKE VOLTAREN (MOBIC) AS NEEDED    USE NASAL SALINE SPRAY DAILY      Sinus Headache    The sinuses are air-filled spaces within the bones of the face. They connect to the inside of the nose. Sinusitis is an inflammation of the tissue lining the sinus cavity. Sinus inflammation can occur during a cold or hay fever (allergies to pollens and other particles in the air) and cause symptoms of sinus congestion and fullness and perhaps a low-grade fever. An infection is usually present when there is also facial pain or headache and green or yellow drainage from the nose or into the back of the throat (postnasal drip). Antibiotics are often prescribed to treat this condition.  Sinus headache may cause pain in different places, depending on which sinuses are infected. There may be pain in the temples, forehead, top of the head, behind or around the eye, across the cheekbone, or into the upper teeth.  You may find that changing your position, sitting upright or lying down, will bring some relief.  Home care  The following guidelines will help you care for yourself at home:  · Drink plenty of water, hot tea, and other liquids to stay well hydrated. This thins the mucus and helps your sinuses drain.  · Apply heat to the painful areas of the face. Use a towel soaked in hot water. Or  the shower with the hot spray on your face. This is a good way to inhale warm water vapor and get heat on your face at the same time. Cover your mouth and nose with your hands so you can still breathe as you do this.  · Use a cool mist vaporizer at night. Suck on peppermint, menthol, or eucalyptus hard candies during the day.  · An expectorant containing guaifenesin helps thin the mucus. It also helps your sinuses drain.  · You may use over-the-counter decongestants unless a similar medicine was prescribed. Nasal sprays or drops work the fastest. Use one that  contains phenylephrine or oxymetazoline. First blow your nose gently to remove mucus. Then apply the spray or drops. Don't use decongestant nasal sprays or drops more often than the label says or for more than 3 days. This can make symptoms worse. Nasal sprays or drops prescribed by your doctor typically do not have these limits. Check with your doctor or pharmacist. You may also use oral tablets containing pseudoephedrine. Side effects from oral decongestants tend to be worse than with nasal sprays or drops, and may keep you from using them. Many sinus remedies combine ingredients, which may increase side effects. Also, if you are taking a combination medicine with another medicine, be sure you are not taking a double dose of anything by mistake. Read the labels or ask the pharmacist for help. Talk with your doctor before using decongestants if you have high blood pressure, heart disease, glaucoma, or prostate trouble.  · Antihistamines may help if allergies are causing your sinusitis. You can get chlorpheniramine and diphenhydramine over the counter, but these can cause drowsiness. Don't use these if you have glaucoma or if you are a man with trouble urinating due to an enlarged prostate. Over-the-counter antihistamines containing loratidine and cetirizine cause less drowsiness and may be a better choice for daytime use.  · When allergies cause your sinusitis, a saline nasal rinse may give relief. A saline nasal rinse reduces swelling and clears excess mucus. This allows sinuses to drain. Prepackaged kits are available at most drugstores. These contain premixed salt packets and an irrigation device. If antibiotics have been prescribed to treat an acute sinus infection, talk with your doctor before using a nasal rinse to be sure it is safe for you.  · You may use over-the-counter medicine to control pain and fever, unless another pain medicine was prescribed. Talk with your doctor before using acetaminophen or  ibuprofen if you have chronic liver or kidney disease. Also talk with your doctor if you have ever had a stomach ulcer. Aspirin should never be used in anyone under 18 years of age who has a fever. It may cause a life-threatening condition called Reye syndrome.  · If antibiotics were given, finish all of them, even if you are feeling better after a few days.  Follow-up care  Follow up with your healthcare provider, or as advised if your symptoms aren't better in 1 week.  Call 911  Call 911 if any of these occur:  · Unusual drowsiness or confusion  · Swelling of the forehead or eyelids  · Vision problems including blurred or double vision  · Seizure  When to seek medical advice  Call your healthcare provider right away if any of these occur:  · Facial pain or headache becomes more severe  · Stiff neck  · Fever over 100.4º F (38.0º C) for more than 3 days on antibiotics  · Bleeding from the nose or throat  Date Last Reviewed: 10/1/2016  © 6897-9057 [employer]. [address] [place] [address], [place] [address]. All rights reserved. This information is not intended as a substitute for professional medical care. Always follow your healthcare professional's instructions.

## 2020-01-27 NOTE — PROGRESS NOTES
[facility] - NEUROLOGY  [facility] [place]    Date: January 28, 2020   Patient Name: Junito Tian   MRN: [id number]   PCP: Barbara L Morgan Schoen  Referring Provider: No ref. provider found    Assessment:      This is Junito Tian, 34 y.o. male with several months of frontal headaches most consistent with sinus headaches.     Plan:      -  Prednisone taper  -  Voltaren prn, daily saline rinses  -  Follow up with PCP regarding sinusitis    Follow up as needed.       I discussed side effects of the medications. I asked the patient to stop the medication if he notices serious adverse effects as we discussed and to seek immediate medical attention at an ER.     Espinoza Whitaker MD  [facility]   Department of Neurology    Subjective:   Headaches resolved following last visit and resumed summer 2019 with frequent mild to moderate frontal pressure like headaches without associated features and concurrent development of fatigue, palpitations, sore throat.  Has taken claratin and zyrtec without improvement.  Has been suggest coarse of moxifloxacin but holding off until neurology evaluation.    10/2017  Headache unchanged, today notes that headache started around the same time as recurrent episcleritis which improved with either valtrex or zithromax drops    HPI 9/2017:   Mr. Junito Tian is a 34 y.o. male who presents with a chief complaint of headaches    For the past 6-7 months he has had mild bifrontal headache without associated nausea, photo/phonophobia, vision changes, or autonomic features.  There was no inciting trauma and he endorses good sleep hygiene and diet as well as adequate hydration although he does have some difficulty with constipation.  He does not take any supplements and does not have any recent stressors.  He notes he had a new sexual partner around onset of symptoms but has had STD testing.    PAST MEDICAL HISTORY:  Past Medical  "History:   Diagnosis Date    Achilles tendon rupture     LEFT       PAST SURGICAL HISTORY:  History reviewed. No pertinent surgical history.    CURRENT MEDS:  Current Outpatient Medications   Medication Sig Dispense Refill    cephALEXin (KEFLEX) 250 MG capsule       doxycycline (VIBRAMYCIN) 100 MG Cap       finasteride (PROPECIA) 1 mg tablet Take 1 mg by mouth once daily.      gabapentin (NEURONTIN) 300 MG capsule       diclofenac (VOLTAREN) 50 MG EC tablet Take 1 tablet (50 mg total) by mouth 3 (three) times daily as needed. 30 tablet 0    predniSONE (DELTASONE) 20 MG tablet Take 2 tabs daily for 4 days then 1 tab daily for 4 days then stop 12 tablet 0     No current facility-administered medications for this visit.        ALLERGIES:  Review of patient's allergies indicates:  No Known Allergies    FAMILY HISTORY:  Family History   Problem Relation Age of Onset    Blindness Neg Hx     Glaucoma Neg Hx     Macular degeneration Neg Hx     Retinal detachment Neg Hx        SOCIAL HISTORY:  Social History     Tobacco Use    Smoking status: Current Some Day Smoker    Smokeless tobacco: Never Used    Tobacco comment: CIGARS   Substance Use Topics    Alcohol use: Yes     Comment: SOCIAL    Drug use: No       Review of Systems:  12 review of systems is negative except for the symptoms mentioned in HPI.        Objective:     Vitals:    01/27/20 1531   Weight: 88.5 kg (195 lb)   Height: 5' 11" (1.803 m)       General: NAD, well nourished   Eyes: no tearing, discharge, no erythema   ENT: moist mucous membranes of the oral cavity, nares patent    Neck: Supple, full range of motion  Cardiovascular: Warm and well perfused, pulses equal and symmetrical  Lungs: Normal work of breathing, normal chest wall excursions  Skin: No rash, lesions, or breakdown on exposed skin  Psychiatry: Mood and affect are appropriate   Abdomen: soft, non tender, non distended  Extremeties: No cyanosis, clubbing or edema.    Neurological "   MENTAL STATUS: Alert and oriented to person, place, and time. Attention and concentration within normal limits. Speech without dysarthria, able to name and repeat without difficulty. Recent and remote memory within normal limits   CRANIAL NERVES: Visual fields intact. PERRL. EOMI. Facial sensation intact. Face symmetrical. Hearing grossly intact. Full shoulder shrug bilaterally. Tongue protrudes midline   MOTOR: Normal bulk and tone. No pronator drift.  5/5 deltoid, biceps, triceps, interosseous, hand  bilaterally. 5/5 iliopsoas, knee extension/flexion, foot dorsi/plantarflexion bilaterally.    CEREBELLAR/COORDINATION/GAIT: Gait steady with normal arm swing and stride length.  Heel to shin intact. Finger to nose intact. Normal rapid alternating movements.

## 2020-07-06 ENCOUNTER — OFFICE VISIT (OUTPATIENT)
Dept: URGENT CARE | Facility: CLINIC | Age: 35
End: 2020-07-06
Payer: OTHER GOVERNMENT

## 2020-07-06 VITALS
TEMPERATURE: 98 F | SYSTOLIC BLOOD PRESSURE: 127 MMHG | RESPIRATION RATE: 12 BRPM | DIASTOLIC BLOOD PRESSURE: 79 MMHG | HEART RATE: 98 BPM | OXYGEN SATURATION: 100 %

## 2020-07-06 DIAGNOSIS — Z20.2 STD EXPOSURE: Primary | ICD-10-CM

## 2020-07-06 LAB
BILIRUB UR QL STRIP: NEGATIVE
GLUCOSE UR QL STRIP: NEGATIVE
KETONES UR QL STRIP: NEGATIVE
LEUKOCYTE ESTERASE UR QL STRIP: NEGATIVE
PH, POC UA: 5.5
POC BLOOD, URINE: NEGATIVE
POC NITRATES, URINE: NEGATIVE
PROT UR QL STRIP: POSITIVE
SP GR UR STRIP: 1.02 (ref 1–1.03)
UROBILINOGEN UR STRIP-ACNC: NORMAL (ref 0.3–2.2)

## 2020-07-06 PROCEDURE — 81003 URINALYSIS AUTO W/O SCOPE: CPT | Mod: QW,S$GLB,, | Performed by: NURSE PRACTITIONER

## 2020-07-06 PROCEDURE — 99214 OFFICE O/P EST MOD 30 MIN: CPT | Mod: 25,S$GLB,, | Performed by: NURSE PRACTITIONER

## 2020-07-06 PROCEDURE — 99214 PR OFFICE/OUTPT VISIT, EST, LEVL IV, 30-39 MIN: ICD-10-PCS | Mod: 25,S$GLB,, | Performed by: NURSE PRACTITIONER

## 2020-07-06 PROCEDURE — 96372 THER/PROPH/DIAG INJ SC/IM: CPT | Mod: S$GLB,,, | Performed by: NURSE PRACTITIONER

## 2020-07-06 PROCEDURE — 96372 PR INJECTION,THERAP/PROPH/DIAG2ST, IM OR SUBCUT: ICD-10-PCS | Mod: S$GLB,,, | Performed by: NURSE PRACTITIONER

## 2020-07-06 PROCEDURE — 81003 POCT URINALYSIS, DIPSTICK, AUTOMATED, W/O SCOPE: ICD-10-PCS | Mod: QW,S$GLB,, | Performed by: NURSE PRACTITIONER

## 2020-07-06 RX ORDER — CEFTRIAXONE 500 MG/1
500 INJECTION, POWDER, FOR SOLUTION INTRAMUSCULAR; INTRAVENOUS
Status: COMPLETED | OUTPATIENT
Start: 2020-07-06 | End: 2020-07-06

## 2020-07-06 RX ORDER — AZITHROMYCIN 1 G/1
1 POWDER, FOR SUSPENSION ORAL ONCE
Qty: 1 PACKET | Refills: 0 | Status: SHIPPED | OUTPATIENT
Start: 2020-07-06 | End: 2020-07-06

## 2020-07-06 RX ADMIN — CEFTRIAXONE 500 MG: 500 INJECTION, POWDER, FOR SOLUTION INTRAMUSCULAR; INTRAVENOUS at 06:07

## 2020-07-06 NOTE — PROGRESS NOTES
Subjective:       Patient ID: Junito Tian is a 34 y.o. male.    Vitals:  temperature is 97.9 °F (36.6 °C). His blood pressure is 127/79 and his pulse is 98. His respiration is 12 and oxygen saturation is 100%.     Chief Complaint: Exposure to STD    Patient complains of painful urination. Patient reports his girlfriend tested positive for gonorrhea and chlamydia. Denies fever, penile discharge, lesions, nausea, vomiting, diarrhea. Reports one sexual partner.     Exposure to STD  Primary symptoms comment: burning while urining . This is a new problem. The current episode started in the past 7 days. The problem occurs constantly. The pain is mild. Pertinent negatives include no chest pain, chills, coughing, diarrhea, dysuria, fever, frequency, headaches, nausea, rash, shortness of breath, sore throat, urgency or vomiting. The symptoms are aggravated by urination. He has tried nothing for the symptoms. The treatment provided no relief. He is sexually active. He inconsistently uses condoms. Yes, his partner has an STD.       Constitution: Negative for chills, fatigue and fever.   HENT: Negative for congestion and sore throat.    Neck: Negative for painful lymph nodes.   Cardiovascular: Negative for chest pain and leg swelling.   Eyes: Negative for double vision and blurred vision.   Respiratory: Negative for cough and shortness of breath.    Gastrointestinal: Negative for nausea, vomiting and diarrhea.   Genitourinary: Negative for dysuria, frequency and urgency.   Musculoskeletal: Negative for joint pain, joint swelling, muscle cramps and muscle ache.   Skin: Negative for color change, pale and rash.   Allergic/Immunologic: Negative for seasonal allergies.   Neurological: Negative for dizziness, history of vertigo, light-headedness, passing out and headaches.   Hematologic/Lymphatic: Negative for swollen lymph nodes, easy bruising/bleeding and history of blood clots. Does not bruise/bleed easily.    Psychiatric/Behavioral: Negative for nervous/anxious, sleep disturbance and depression. The patient is not nervous/anxious.        Objective:      Physical Exam   Constitutional: He is oriented to person, place, and time. He appears well-developed. He is cooperative.  Non-toxic appearance. He does not appear ill. No distress.   HENT:   Head: Normocephalic and atraumatic.   Right Ear: Hearing, tympanic membrane, external ear and ear canal normal.   Left Ear: Hearing, tympanic membrane, external ear and ear canal normal.   Nose: Nose normal. No mucosal edema, rhinorrhea or nasal deformity. No epistaxis. Right sinus exhibits no maxillary sinus tenderness and no frontal sinus tenderness. Left sinus exhibits no maxillary sinus tenderness and no frontal sinus tenderness.   Mouth/Throat: Uvula is midline, oropharynx is clear and moist and mucous membranes are normal. No trismus in the jaw. Normal dentition. No uvula swelling. No posterior oropharyngeal erythema.   Eyes: Conjunctivae and lids are normal. Right eye exhibits no discharge. Left eye exhibits no discharge. No scleral icterus.   Neck: Trachea normal, normal range of motion, full passive range of motion without pain and phonation normal. Neck supple.   Cardiovascular: Normal rate, regular rhythm, normal heart sounds and normal pulses.   Pulmonary/Chest: Effort normal and breath sounds normal. No respiratory distress.   Abdominal: Soft. Normal appearance and bowel sounds are normal. He exhibits no distension, no pulsatile midline mass and no mass. There is no abdominal tenderness.   Musculoskeletal: Normal range of motion.         General: No deformity.   Neurological: He is alert and oriented to person, place, and time. He exhibits normal muscle tone. Coordination normal. GCS eye subscore is 4. GCS verbal subscore is 5. GCS motor subscore is 6.   Skin: Skin is warm, dry, intact, not diaphoretic and not pale.   Psychiatric: His speech is normal and behavior is  normal. Judgment and thought content normal.   Nursing note and vitals reviewed.        Assessment:       1. STD exposure        Plan:       Will call with lab results. Will treat for gonorrhea and chlamydia today as that is what his girlfriend tested positive for.     Advised : Complete all your antibiotics. Refrain from having sex for 2 weeks. We will call you with the results of your labs, if anything is positive, your partner(s) must be treated. You were not tested today for life threatening sexually transmitted diseases, such as HIV and Syphilis.  For further evaluation: Follow up at                      [facility]                    [facility]                    [address] [place] [id number]                    [phone]    STD exposure  -     POCT Urinalysis, Dipstick, Automated, W/O Scope    Other orders  -     azithromycin (ZITHROMAX) 1 gram Pack; Take 1 g by mouth once. for 1 dose  Dispense: 1 packet; Refill: 0  -     cefTRIAXone injection 500 mg

## 2020-07-06 NOTE — PATIENT INSTRUCTIONS
Suspected STI, Culture Only  Your symptoms suggest that you may have a sexually transmitted infection (STI). The most common bacteria that cause STIs are chlamydia and gonorrhea. Both are highly contagious. They are passed by sexual contact with an infected partner.  Symptoms begin within 1 to 3 weeks after exposure. There is usually a discharge from the penis or vagina and burning during urination. Many women with one of these infections will have only mild symptoms or no symptoms at all early in the disease.  Culture tests have been taken. These will show if you have an infection with chlamydia or gonorrhea. These infections can be treated and cured with antibiotic medicine.  Home care  Do not have sex until you know that your test result is negative.  If a culture was done, call for the results. If the culture is positive, contact your healthcare provider, local clinic, or local public health department to be treated, or return to our facility.  · You will be prescribed antibiotic medicine. Be sure to take all of the antibiotic as prescribed until it is gone or you are told to stop. Keep taking it even if you feel better.  · Both you and your sexual partner or partners need to be treated, even if the partner has no symptoms.  · Do not have sex until both you and your partner or partners have finished all antibiotic medicine and you are told that you are no longer contagious.  Learn about safe sex practices and use these in the future. The safest sex is with a partner who has tested negative for STIs and only has sex with you. Condoms can help prevent the spread of gonorrhea and chlamydia, but are not a guarantee.  Follow-up care  Follow up with your healthcare provider or as advised by our staff. Call as directed for the results of your culture. If your culture test is positive and you are treated with an antibiotic, you should have another culture taken 4 to 6 weeks after treatment. This is to be sure the  infection has cleared. Follow up with your provider or the public health department for complete STI screening, including HIV testing. For more information about STIs, call the CDC information line at 284-890-7549.  When to seek medical advice  Call your healthcare provider if any of these occur:  · Fever of 100.4ºF (38.0ºC) or higher, or as directed  · New pain in your lower belly (abdomen) or back or pain that gets worse  · Unexpected vaginal bleeding  · Weakness, dizziness, or fainting  · Repeated vomiting  · Inability to urinate because of pain  · Rash or joint pain  · Painful open sores on the penis or around the outer vagina  · Enlarged painful lumps (lymph nodes) in the groin  · Testicle pain or scrotal swelling in men  Date Last Reviewed: 9/25/2015  © 5025-4865 [employer]. [address] [place] [address], [place] [address]. All rights reserved. This information is not intended as a substitute for professional medical care. Always follow your healthcare professional's instructions.

## 2020-07-09 LAB
C TRACH RRNA SPEC QL NAA+PROBE: NEGATIVE
N GONORRHOEA RRNA SPEC QL NAA+PROBE: NEGATIVE
T VAGINALIS DNA SPEC QL NAA+PROBE: NEGATIVE

## 2020-07-10 ENCOUNTER — TELEPHONE (OUTPATIENT)
Dept: URGENT CARE | Facility: CLINIC | Age: 35
End: 2020-07-10

## 2020-07-10 NOTE — TELEPHONE ENCOUNTER
----- Message from Dianna Tierney NP sent at 7/10/2020 10:32 AM CDT -----  STD results are negative.

## 2020-07-29 ENCOUNTER — HOSPITAL ENCOUNTER (EMERGENCY)
Facility: HOSPITAL | Age: 35
Discharge: HOME | End: 2020-07-29
Attending: EMERGENCY MEDICINE
Payer: OTHER GOVERNMENT

## 2020-07-29 VITALS
TEMPERATURE: 99 F | HEART RATE: 99 BPM | SYSTOLIC BLOOD PRESSURE: 170 MMHG | OXYGEN SATURATION: 99 % | RESPIRATION RATE: 18 BRPM | WEIGHT: 198.44 LBS | DIASTOLIC BLOOD PRESSURE: 79 MMHG | BODY MASS INDEX: 27.67 KG/M2

## 2020-07-29 DIAGNOSIS — N41.1 CHRONIC PROSTATITIS: Primary | ICD-10-CM

## 2020-07-29 PROCEDURE — 63600175 PHARM REV CODE 636 W HCPCS: Performed by: EMERGENCY MEDICINE

## 2020-07-29 PROCEDURE — 99284 EMERGENCY DEPT VISIT MOD MDM: CPT | Mod: 25

## 2020-07-29 PROCEDURE — 96372 THER/PROPH/DIAG INJ SC/IM: CPT

## 2020-07-29 RX ORDER — GENTAMICIN SULFATE 40 MG/ML
160 INJECTION, SOLUTION INTRAMUSCULAR; INTRAVENOUS
Status: DISCONTINUED | OUTPATIENT
Start: 2020-07-29 | End: 2020-07-29

## 2020-07-29 RX ORDER — GENTAMICIN SULFATE 40 MG/ML
160 INJECTION, SOLUTION INTRAMUSCULAR; INTRAVENOUS ONCE
Status: COMPLETED | OUTPATIENT
Start: 2020-07-29 | End: 2020-07-29

## 2020-07-29 RX ORDER — DOXYCYCLINE 100 MG/1
100 CAPSULE ORAL EVERY 12 HOURS
Qty: 14 CAPSULE | Refills: 0 | Status: SHIPPED | OUTPATIENT
Start: 2020-07-29 | End: 2020-08-05

## 2020-07-29 RX ADMIN — GENTAMICIN SULFATE 160 MG: 40 INJECTION, SOLUTION INTRAMUSCULAR; INTRAVENOUS at 04:07

## 2020-07-29 NOTE — ED TRIAGE NOTES
Patient comes to the ER with prostate issues he states. Patient states he has a treatment plan already in place from a specialist and having trouble seeing him. Patient states he has been having rectal bleeding from his issue. No problems urinating.

## 2020-07-29 NOTE — ED PROVIDER NOTES
"Encounter Date: 7/29/2020    SCRIBE #1 NOTE: I, Dorindamechelle Castro, am scribing for, and in the presence of, Dionte Dowling MD.       History     Chief Complaint   Patient presents with    Prostate Problem     states having prostate pain, has been seen before for same pain, can not get in with specialist.       Time seen by provider: 3:49 PM on 07/29/2020    Junito Tian is a 34 y.o. male who presents to the ED with complaints of rectal pain that started ~2-3 weeks ago. He reports blood in the stool, left sided testicular pain, and left flank pain. Patient states blood is bright red and mixed in the stool. He denies constipation or diarrhea. Patient reports some pain with urination but denies other urinary symptoms. He endorses having similar pain in the past and has visited a specialist. At the time, he was received a Gentamycin shot and Metronidazole last month on 6/28/2020. Patient was also prescribed Doxycycline with relief. He reports getting in touch with the specialist for returning pain but states the doctor is "backed up till September". Patient denies fever, chills, body aches, and other new symptoms. He has no other medical concerns or complaints at this moment. No PMHx or PSHx.     The history is provided by the patient.     Review of patient's allergies indicates:  No Known Allergies  Past Medical History:   Diagnosis Date    Achilles tendon rupture     LEFT     History reviewed. No pertinent surgical history.  Family History   Problem Relation Age of Onset    Blindness Neg Hx     Glaucoma Neg Hx     Macular degeneration Neg Hx     Retinal detachment Neg Hx      Social History     Tobacco Use    Smoking status: Current Some Day Smoker    Smokeless tobacco: Never Used    Tobacco comment: CIGARS   Substance Use Topics    Alcohol use: Yes     Comment: SOCIAL    Drug use: No     Review of Systems   Constitutional: Negative for chills and fever.   Respiratory: Negative for shortness of " breath.    Cardiovascular: Negative for chest pain.   Gastrointestinal: Positive for anal bleeding and rectal pain. Negative for constipation and diarrhea.   Genitourinary: Positive for dysuria, flank pain and testicular pain. Negative for decreased urine volume, difficulty urinating, frequency, hematuria and urgency.   Musculoskeletal: Negative for gait problem.   Skin: Negative for pallor and rash.   Neurological: Negative for headaches.   Hematological: Does not bruise/bleed easily.   Psychiatric/Behavioral: The patient is not nervous/anxious.        Physical Exam     Initial Vitals [07/29/20 1525]   BP Pulse Resp Temp SpO2   (!) 170/79 99 18 98.7 °F (37.1 °C) 99 %      MAP       --         Physical Exam    Nursing note and vitals reviewed.  Constitutional: He appears well-developed and well-nourished. He is not diaphoretic. No distress.   HENT:   Head: Normocephalic and atraumatic.   Mouth/Throat: Oropharynx is clear and moist.   Eyes: Conjunctivae are normal.   Neck: Neck supple.   Cardiovascular: Normal rate, regular rhythm, normal heart sounds and intact distal pulses. Exam reveals no gallop and no friction rub.    No murmur heard.  Pulmonary/Chest: Breath sounds normal. He has no wheezes. He has no rhonchi. He has no rales.   Abdominal: Soft. He exhibits no distension. There is no abdominal tenderness. There is no rebound and no guarding.   Musculoskeletal: Normal range of motion.   Neurological: He is alert and oriented to person, place, and time.   Skin: No rash noted. No erythema.         ED Course   Procedures  Labs Reviewed - No data to display       Imaging Results    None          Medical Decision Making:   History:   Old Medical Records: I decided to obtain old medical records.  Initial Assessment:   33-year-old man with a history of chronic prostatitis presents seeking antibiotic therapy since he is unable to see his urologist for several weeks.  He brings in paperwork from [facility] [place] [facility] where he  was treated with gentamicin.  He also states doxycycline helped him quite a bit and is requesting these medications.  I will give him 160 mg of gentamicin IM and prescribed him 7 days of doxycycline 100 mg b.i.d..  He is afebrile well-appearing nontoxic.  I have low suspicion prostatic abscess.  Return precautions discussed.  He is to follow-up with urology.            Scribe Attestation:   Scribe #1: I performed the above scribed service and the documentation accurately describes the services I performed. I attest to the accuracy of the note.      I, Nitesh Adhikari, personally performed the services described in this documentation. All medical record entries made by the scribe were at my direction and in my presence.  I have reviewed the chart and agree that the record reflects my personal performance and is accurate and complete. Dionte Dowling MD.             Clinical Impression:       ICD-10-CM ICD-9-CM   1. Chronic prostatitis  N41.1 601.1                                Dionte Dowling MD  07/29/20 4682

## 2021-01-18 ENCOUNTER — HOSPITAL ENCOUNTER (EMERGENCY)
Facility: HOSPITAL | Age: 36
Discharge: HOME | End: 2021-01-18
Attending: EMERGENCY MEDICINE
Payer: OTHER GOVERNMENT

## 2021-01-18 VITALS
HEART RATE: 86 BPM | OXYGEN SATURATION: 98 % | BODY MASS INDEX: 27.3 KG/M2 | SYSTOLIC BLOOD PRESSURE: 123 MMHG | HEIGHT: 71 IN | RESPIRATION RATE: 18 BRPM | TEMPERATURE: 98 F | DIASTOLIC BLOOD PRESSURE: 76 MMHG | WEIGHT: 195 LBS

## 2021-01-18 DIAGNOSIS — N45.1 CHRONIC EPIDIDYMITIS: Primary | ICD-10-CM

## 2021-01-18 LAB
BILIRUB UR QL STRIP: NEGATIVE
CLARITY UR: CLEAR
COLOR UR: YELLOW
GLUCOSE UR QL STRIP: NEGATIVE
HGB UR QL STRIP: NEGATIVE
KETONES UR QL STRIP: NEGATIVE
LEUKOCYTE ESTERASE UR QL STRIP: NEGATIVE
NITRITE UR QL STRIP: NEGATIVE
PH UR STRIP: 7 [PH] (ref 5–8)
PROT UR QL STRIP: ABNORMAL
SP GR UR STRIP: 1.02 (ref 1–1.03)
URN SPEC COLLECT METH UR: ABNORMAL
UROBILINOGEN UR STRIP-ACNC: NEGATIVE EU/DL

## 2021-01-18 PROCEDURE — 96372 THER/PROPH/DIAG INJ SC/IM: CPT

## 2021-01-18 PROCEDURE — 63600175 PHARM REV CODE 636 W HCPCS: Performed by: PHYSICIAN ASSISTANT

## 2021-01-18 PROCEDURE — 81003 URINALYSIS AUTO W/O SCOPE: CPT

## 2021-01-18 PROCEDURE — 99284 EMERGENCY DEPT VISIT MOD MDM: CPT | Mod: 25

## 2021-01-18 RX ORDER — GENTAMICIN SULFATE 40 MG/ML
160 INJECTION, SOLUTION INTRAMUSCULAR; INTRAVENOUS
Status: DISCONTINUED | OUTPATIENT
Start: 2021-01-18 | End: 2021-01-18 | Stop reason: HOSPADM

## 2021-01-18 RX ORDER — MOXIFLOXACIN HYDROCHLORIDE 400 MG/1
400 TABLET ORAL DAILY
Qty: 10 TABLET | Refills: 0 | Status: SHIPPED | OUTPATIENT
Start: 2021-01-18 | End: 2021-01-28

## 2021-01-18 RX ADMIN — GENTAMICIN SULFATE 160 MG: 40 INJECTION, SOLUTION INTRAMUSCULAR; INTRAVENOUS at 07:01

## 2021-04-04 ENCOUNTER — HOSPITAL ENCOUNTER (EMERGENCY)
Facility: HOSPITAL | Age: 36
Discharge: HOME | End: 2021-04-04
Attending: EMERGENCY MEDICINE
Payer: OTHER GOVERNMENT

## 2021-04-04 VITALS
WEIGHT: 195 LBS | HEIGHT: 72 IN | HEART RATE: 72 BPM | TEMPERATURE: 98 F | RESPIRATION RATE: 16 BRPM | SYSTOLIC BLOOD PRESSURE: 124 MMHG | DIASTOLIC BLOOD PRESSURE: 78 MMHG | BODY MASS INDEX: 26.41 KG/M2 | OXYGEN SATURATION: 100 %

## 2021-04-04 DIAGNOSIS — N50.812 TESTICULAR PAIN, LEFT: ICD-10-CM

## 2021-04-04 DIAGNOSIS — N45.1 CHRONIC EPIDIDYMITIS: Primary | ICD-10-CM

## 2021-04-04 LAB
BILIRUB UR QL STRIP: NEGATIVE
CLARITY UR: CLEAR
COLOR UR: YELLOW
GLUCOSE UR QL STRIP: NEGATIVE
HGB UR QL STRIP: ABNORMAL
KETONES UR QL STRIP: NEGATIVE
LEUKOCYTE ESTERASE UR QL STRIP: NEGATIVE
NITRITE UR QL STRIP: NEGATIVE
PH UR STRIP: 6 [PH] (ref 5–8)
PROT UR QL STRIP: NEGATIVE
SP GR UR STRIP: >=1.03 (ref 1–1.03)
URN SPEC COLLECT METH UR: ABNORMAL
UROBILINOGEN UR STRIP-ACNC: NEGATIVE EU/DL

## 2021-04-04 PROCEDURE — 81003 URINALYSIS AUTO W/O SCOPE: CPT | Performed by: EMERGENCY MEDICINE

## 2021-04-04 PROCEDURE — 96372 THER/PROPH/DIAG INJ SC/IM: CPT | Mod: 25

## 2021-04-04 PROCEDURE — 99284 EMERGENCY DEPT VISIT MOD MDM: CPT | Mod: 25

## 2021-04-04 PROCEDURE — 63600175 PHARM REV CODE 636 W HCPCS: Performed by: EMERGENCY MEDICINE

## 2021-04-04 RX ORDER — GENTAMICIN SULFATE 40 MG/ML
164 INJECTION, SOLUTION INTRAMUSCULAR; INTRAVENOUS
Status: COMPLETED | OUTPATIENT
Start: 2021-04-04 | End: 2021-04-04

## 2021-04-04 RX ORDER — MOXIFLOXACIN HYDROCHLORIDE 400 MG/1
400 TABLET ORAL DAILY
Qty: 7 TABLET | Refills: 0 | Status: SHIPPED | OUTPATIENT
Start: 2021-04-04 | End: 2021-04-11

## 2021-04-04 RX ADMIN — GENTAMICIN SULFATE 164 MG: 40 INJECTION, SOLUTION INTRAMUSCULAR; INTRAVENOUS at 06:04

## 2021-06-01 ENCOUNTER — HOSPITAL ENCOUNTER (EMERGENCY)
Facility: HOSPITAL | Age: 36
Discharge: LEFT BEFORE DISCHARGE | End: 2021-06-01
Attending: EMERGENCY MEDICINE
Payer: OTHER GOVERNMENT

## 2021-06-01 VITALS
HEIGHT: 72 IN | HEART RATE: 85 BPM | TEMPERATURE: 98 F | SYSTOLIC BLOOD PRESSURE: 132 MMHG | OXYGEN SATURATION: 97 % | BODY MASS INDEX: 26.41 KG/M2 | WEIGHT: 195 LBS | DIASTOLIC BLOOD PRESSURE: 83 MMHG | RESPIRATION RATE: 16 BRPM

## 2021-06-01 DIAGNOSIS — N45.1 CHRONIC EPIDIDYMITIS: Primary | ICD-10-CM

## 2021-06-01 LAB
BILIRUB UR QL STRIP: NEGATIVE
CLARITY UR: CLEAR
COLOR UR: YELLOW
GLUCOSE UR QL STRIP: NEGATIVE
HGB UR QL STRIP: NEGATIVE
KETONES UR QL STRIP: NEGATIVE
LEUKOCYTE ESTERASE UR QL STRIP: NEGATIVE
NITRITE UR QL STRIP: NEGATIVE
PH UR STRIP: 8 [PH] (ref 5–8)
PROT UR QL STRIP: NEGATIVE
SP GR UR STRIP: 1.02 (ref 1–1.03)
URN SPEC COLLECT METH UR: NORMAL
UROBILINOGEN UR STRIP-ACNC: NEGATIVE EU/DL

## 2021-06-01 PROCEDURE — 99283 EMERGENCY DEPT VISIT LOW MDM: CPT

## 2021-06-01 PROCEDURE — 81003 URINALYSIS AUTO W/O SCOPE: CPT | Performed by: EMERGENCY MEDICINE

## 2021-06-01 PROCEDURE — 87086 URINE CULTURE/COLONY COUNT: CPT | Performed by: EMERGENCY MEDICINE

## 2021-06-01 RX ORDER — CEFTRIAXONE 1 G/1
1 INJECTION, POWDER, FOR SOLUTION INTRAMUSCULAR; INTRAVENOUS
Status: DISCONTINUED | OUTPATIENT
Start: 2021-06-01 | End: 2021-06-01 | Stop reason: HOSPADM

## 2021-06-01 RX ORDER — MOXIFLOXACIN HYDROCHLORIDE 400 MG/1
400 TABLET ORAL DAILY
Qty: 10 TABLET | Refills: 0 | Status: SHIPPED | OUTPATIENT
Start: 2021-06-01 | End: 2021-11-24 | Stop reason: CLARIF

## 2021-06-03 LAB — BACTERIA UR CULT: NO GROWTH

## 2021-07-01 ENCOUNTER — PATIENT MESSAGE (OUTPATIENT)
Dept: ADMINISTRATIVE | Facility: OTHER | Age: 36
End: 2021-07-01

## 2021-11-24 ENCOUNTER — HOSPITAL ENCOUNTER (EMERGENCY)
Facility: HOSPITAL | Age: 36
Discharge: HOME | End: 2021-11-24
Attending: EMERGENCY MEDICINE
Payer: OTHER GOVERNMENT

## 2021-11-24 VITALS
HEART RATE: 112 BPM | RESPIRATION RATE: 18 BRPM | HEIGHT: 72 IN | WEIGHT: 185 LBS | OXYGEN SATURATION: 99 % | DIASTOLIC BLOOD PRESSURE: 98 MMHG | SYSTOLIC BLOOD PRESSURE: 157 MMHG | TEMPERATURE: 98 F | BODY MASS INDEX: 25.06 KG/M2

## 2021-11-24 DIAGNOSIS — N45.1 CHRONIC EPIDIDYMITIS: Primary | ICD-10-CM

## 2021-11-24 DIAGNOSIS — N41.1 PROSTATITIS, CHRONIC: ICD-10-CM

## 2021-11-24 LAB
BILIRUB UR QL STRIP: NEGATIVE
CLARITY UR: CLEAR
COLOR UR: YELLOW
GLUCOSE UR QL STRIP: NEGATIVE
HGB UR QL STRIP: ABNORMAL
KETONES UR QL STRIP: NEGATIVE
LEUKOCYTE ESTERASE UR QL STRIP: NEGATIVE
NITRITE UR QL STRIP: NEGATIVE
PH UR STRIP: 7 [PH] (ref 5–8)
PROT UR QL STRIP: NEGATIVE
SP GR UR STRIP: 1.02 (ref 1–1.03)
URN SPEC COLLECT METH UR: ABNORMAL
UROBILINOGEN UR STRIP-ACNC: NEGATIVE EU/DL

## 2021-11-24 PROCEDURE — 96372 THER/PROPH/DIAG INJ SC/IM: CPT

## 2021-11-24 PROCEDURE — 81003 URINALYSIS AUTO W/O SCOPE: CPT | Performed by: EMERGENCY MEDICINE

## 2021-11-24 PROCEDURE — 63600175 PHARM REV CODE 636 W HCPCS: Performed by: EMERGENCY MEDICINE

## 2021-11-24 PROCEDURE — 99284 EMERGENCY DEPT VISIT MOD MDM: CPT | Mod: 25

## 2021-11-24 RX ORDER — GENTAMICIN SULFATE 40 MG/ML
3 INJECTION, SOLUTION INTRAMUSCULAR; INTRAVENOUS ONCE
Status: COMPLETED | OUTPATIENT
Start: 2021-11-24 | End: 2021-11-24

## 2021-11-24 RX ORDER — MOXIFLOXACIN HYDROCHLORIDE 400 MG/1
400 TABLET ORAL DAILY
Qty: 10 TABLET | Refills: 0 | Status: SHIPPED | OUTPATIENT
Start: 2021-11-24 | End: 2021-12-04

## 2021-11-24 RX ORDER — DOXYCYCLINE 100 MG/1
100 CAPSULE ORAL 2 TIMES DAILY
Qty: 20 CAPSULE | Refills: 0 | Status: SHIPPED | OUTPATIENT
Start: 2021-11-24 | End: 2021-12-04

## 2021-11-24 RX ADMIN — GENTAMICIN SULFATE 251.7 MG: 40 INJECTION, SOLUTION INTRAMUSCULAR; INTRAVENOUS at 06:11

## 2023-05-11 ENCOUNTER — HOSPITAL ENCOUNTER (EMERGENCY)
Facility: HOSPITAL | Age: 38
Discharge: HOME | End: 2023-05-11
Attending: EMERGENCY MEDICINE
Payer: OTHER GOVERNMENT

## 2023-05-11 VITALS
HEART RATE: 79 BPM | TEMPERATURE: 98 F | DIASTOLIC BLOOD PRESSURE: 80 MMHG | BODY MASS INDEX: 28 KG/M2 | SYSTOLIC BLOOD PRESSURE: 130 MMHG | WEIGHT: 200 LBS | OXYGEN SATURATION: 98 % | RESPIRATION RATE: 20 BRPM | HEIGHT: 71 IN

## 2023-05-11 DIAGNOSIS — N41.9 PROSTATITIS, UNSPECIFIED PROSTATITIS TYPE: Primary | ICD-10-CM

## 2023-05-11 LAB
BILIRUB UR QL STRIP: NEGATIVE
CLARITY UR: CLEAR
COLOR UR: YELLOW
GLUCOSE UR QL STRIP: NEGATIVE
HGB UR QL STRIP: NEGATIVE
KETONES UR QL STRIP: NEGATIVE
LEUKOCYTE ESTERASE UR QL STRIP: NEGATIVE
NITRITE UR QL STRIP: NEGATIVE
PH UR STRIP: 7 [PH] (ref 5–8)
PROT UR QL STRIP: NEGATIVE
SP GR UR STRIP: 1.01 (ref 1–1.03)
URN SPEC COLLECT METH UR: NORMAL
UROBILINOGEN UR STRIP-ACNC: NEGATIVE EU/DL

## 2023-05-11 PROCEDURE — 25000003 PHARM REV CODE 250: Performed by: PHYSICIAN ASSISTANT

## 2023-05-11 PROCEDURE — 63600175 PHARM REV CODE 636 W HCPCS: Performed by: PHYSICIAN ASSISTANT

## 2023-05-11 PROCEDURE — 99284 EMERGENCY DEPT VISIT MOD MDM: CPT | Mod: 25

## 2023-05-11 PROCEDURE — 81003 URINALYSIS AUTO W/O SCOPE: CPT | Performed by: PHYSICIAN ASSISTANT

## 2023-05-11 PROCEDURE — 96365 THER/PROPH/DIAG IV INF INIT: CPT

## 2023-05-11 PROCEDURE — 87591 N.GONORRHOEAE DNA AMP PROB: CPT | Performed by: PHYSICIAN ASSISTANT

## 2023-05-11 RX ORDER — LEVOFLOXACIN 500 MG/1
500 TABLET, FILM COATED ORAL DAILY
Qty: 7 TABLET | Refills: 0 | Status: SHIPPED | OUTPATIENT
Start: 2023-05-11 | End: 2023-05-18

## 2023-05-11 RX ORDER — GENTAMICIN SULFATE 40 MG/ML
2.5 INJECTION, SOLUTION INTRAMUSCULAR; INTRAVENOUS ONCE
Status: DISCONTINUED | OUTPATIENT
Start: 2023-05-11 | End: 2023-05-11

## 2023-05-11 RX ORDER — LEVOFLOXACIN 500 MG/1
500 TABLET, FILM COATED ORAL ONCE
Status: COMPLETED | OUTPATIENT
Start: 2023-05-11 | End: 2023-05-11

## 2023-05-11 RX ADMIN — LEVOFLOXACIN 500 MG: 500 TABLET, FILM COATED ORAL at 07:05

## 2023-05-11 RX ADMIN — GENTAMICIN SULFATE 240 MG: 40 INJECTION, SOLUTION INTRAMUSCULAR; INTRAVENOUS at 07:05

## 2023-05-11 NOTE — ED PROVIDER NOTES
Encounter Date: 5/11/2023    SCRIBE #1 NOTE: I, Christiano Dwight, am scribing for, and in the presence of,  Sakina Porras PA-C.     History     Chief Complaint   Patient presents with    prostate pain     Patient report prostate pain x 3 weeks      Time seen by provider: 5:41 PM on 05/11/2023    Junito Tian is a 37 y.o. male who presents to the ED with an onset of pain to his prostate that began 3-4 weeks ago, as well as pressure-like dysuria that began recently. He states he deals with this pain frequently, and last experienced it several months ago in February. He has a urologist in [place], but he has not seen them recently. The patient denies fever, chills, abdominal pain, nausea, vomiting, diarrhea, constipation, bloody stool, scrotal pain, or any other symptoms at this time. There is no pertinent PMHx or recorded PSHx.    The history is provided by the patient.   Review of patient's allergies indicates:  No Known Allergies  Past Medical History:   Diagnosis Date    Achilles tendon rupture     LEFT     No past surgical history on file.  Family History   Problem Relation Age of Onset    Blindness Neg Hx     Glaucoma Neg Hx     Macular degeneration Neg Hx     Retinal detachment Neg Hx      Social History     Tobacco Use    Smoking status: Some Days    Smokeless tobacco: Never    Tobacco comments:     CIGARS   Substance Use Topics    Alcohol use: Yes     Comment: SOCIAL    Drug use: No     Review of Systems   Constitutional:  Negative for chills and fever.   Respiratory:  Negative for cough, chest tightness, shortness of breath and wheezing.    Cardiovascular:  Negative for chest pain and palpitations.   Gastrointestinal:  Negative for abdominal pain, blood in stool, constipation, diarrhea, nausea and vomiting.   Genitourinary:  Negative for dysuria, hematuria and testicular pain.        Positive for prostate pain.   Musculoskeletal:  Negative for arthralgias, back pain, joint swelling, myalgias, neck  pain and neck stiffness.   Skin:  Negative for color change, pallor, rash and wound.   Neurological:  Negative for dizziness, syncope, weakness, light-headedness, numbness and headaches.   Hematological:  Does not bruise/bleed easily.   Psychiatric/Behavioral:  The patient is not nervous/anxious.      Physical Exam     Initial Vitals [05/11/23 1733]   BP Pulse Resp Temp SpO2   (!) 147/86 78 20 98 °F (36.7 °C) 100 %      MAP       --         Physical Exam    Nursing note and vitals reviewed.  Constitutional: He appears well-developed and well-nourished. He is not diaphoretic. No distress.   HENT:   Head: Normocephalic and atraumatic.   Mouth/Throat: Oropharynx is clear and moist.   Cardiovascular:  Normal rate, regular rhythm, normal heart sounds and intact distal pulses.     Exam reveals no gallop and no friction rub.       No murmur heard.  Pulmonary/Chest: Breath sounds normal. No respiratory distress. He has no wheezes. He has no rhonchi. He has no rales. He exhibits no tenderness.   Abdominal: Abdomen is soft. He exhibits no distension and no mass. There is no abdominal tenderness. Hernia confirmed negative in the right inguinal area and confirmed negative in the left inguinal area.   Genitourinary:    Testes normal.   Right testis shows no swelling and no tenderness. Left testis shows no swelling and no tenderness. Circumcised.    Genitourinary Comments: Rectal exam declined.  No swelling or TTP noted to testicles or perineal region.  No palpable cord.  No erythema, swelling or induration.       Musculoskeletal:         General: No tenderness or edema. Normal range of motion.     Lymphadenopathy: No inguinal adenopathy noted on the right or left side.   Neurological: He is alert and oriented to person, place, and time. He has normal strength. No sensory deficit.   Skin: Skin is warm and dry. No rash and no abscess noted. No erythema.   Psychiatric: He has a normal mood and affect.       ED Course    Procedures  Labs Reviewed   C. TRACHOMATIS/N. GONORRHOEAE BY AMP DNA   URINALYSIS, REFLEX TO URINE CULTURE    Narrative:     Specimen Source->Urine          Imaging Results    None          Medications   levoFLOXacin tablet 500 mg (500 mg Oral Given 5/11/23 1938)   gentamicin (GARAMYCIN) 240 mg in sodium chloride 0.9% 100 mL IVPB (0 mg Intravenous Stopped 5/11/23 2012)     Medical Decision Making:   History:   Old Medical Records: I decided to obtain old medical records.  Old Records Summarized: records from clinic visits and records from previous admission(s).  Differential Diagnosis:   Prostatitis   Epididymitis   UTI   STI   Clinical Tests:   Lab Tests: Ordered and Reviewed  ED Management:  Pt emergently evaluated here in the ED.  UA negative for infection.  GC/chlamydia pending, but he states he has never tested positive for it in the past.  He has been treated in past with Gentamicin and Levaquin.  He is given a dose of Gentamicin here in the ED and will be discharged home to follow-up with Urology for re-evaluation and further management.  Patient voices understanding and is agreeable to the plan.  Specific return precautions are given.           Scribe Attestation:   Scribe #1: I performed the above scribed service and the documentation accurately describes the services I performed. I attest to the accuracy of the note.            I, Sakina Porras PA-C, personally performed the services described in this documentation. All medical record entries made by the scribe were at my direction and in my presence.  I have reviewed the chart and agree that the record reflects my personal performance and is accurate and complete. Sakina Porras PA-C.  8:15 PM 05/11/2023         Clinical Impression:   Final diagnoses:  [N41.9] Prostatitis, unspecified prostatitis type (Primary)        ED Disposition Condition    Discharge Stable          ED Prescriptions       Medication Sig Dispense Start Date End Date Auth.  Provider    levoFLOXacin (LEVAQUIN) 500 MG tablet Take 1 tablet (500 mg total) by mouth once daily. for 7 days 7 tablet 5/11/2023 5/18/2023 Sakina Porras PA-C          Follow-up Information       Follow up With Specialties Details Why Contact Info    [facility] Emergency Dept Emergency Medicine  As needed, If symptoms worsen [address] [place] [id number] [phone]    Urology   for re-evaluation and further management              Sakina Porras PA-C  05/11/23 2015

## 2023-05-12 LAB
C TRACH DNA SPEC QL NAA+PROBE: NOT DETECTED
N GONORRHOEA DNA SPEC QL NAA+PROBE: NOT DETECTED

## 2024-10-24 NOTE — PROGRESS NOTES
Internal Medicine History & Physical     Name: Favio Kitchen Jr.  : 1951  Chief Complaint: Hypertension and Tachycardia (125 at home. Had ablation last week. )  Primary Care Physician: Arash Cummings MD  Admission date: 10/23/2024  Date of service: 10/24/2024     History of Present Illness  Favio is a 72 y.o. year old male.  Patient states he was at the gym working out the last 3 days and developed high blood pressure and heart fast heart rate.  On , he had an ablation at [facility].  He was told to wait 1 week before resuming his workouts.  Patient states he waited 2 weeks and started again.  States the first day he started having some increase in shortness of breath and palpitations.  The next day he started having worsening symptoms.  Finally, yesterday, he started having increased blood pressure along with increased heart rate.  He was told to hold his flecainide and his metoprolol after discharge on .  Patient denies any fevers, chills, headache, vision or hearing changes, dysuria, hematuria, constipation, diarrhea, change in bowel or bladder habits.  Denies any changes in appetite.  Patient states nothing was making the symptoms better.  Worsening with any exercise or activity.  Patient found to be in atrial flutter with heart rate in the 120s.    Patient was seen by cardiology and felt to be a candidate for cardioversion.  Patient had cardioversion earlier today and tolerated the procedure.  He is back in a sinus rhythm and breathing well.  No current symptoms.  Recommended to be restarted on his flecainide and metoprolol.  He is also to continue his anticoagulation.      ED course:   Initial blood work and imaging studies performed. Admission recommended by ED physician. Case was discussed with ED provider. Meds in ED consisted of the following:  Medications   melatonin tablet 3 mg (has no administration in time range)   sodium chloride flush 0.9 % injection 10 mL (10 mLs  Subjective:       Patient ID: Junito Tian is a 32 y.o. male.    Chief Complaint: Testicle Pain    HPI     32 year old with left testicular pain beginning 2 weeks ago.  There was no swelling.  He says the pain is constant.  He had a similar episode 2 months ago.  He was seen in urgent care and urine culture was reportedly positive for Enterococcus.  He was treated with a course of antibiotics.  Cipro for 7 days.  Doxy for 7 days and a single dose of Zithromax.  He denies hematuria and dysuria.  No urethral discharge.   He is concerned about STD but no previous STD.    Urine dipstick shows negative for all components.    Past Medical History:   Diagnosis Date    Achilles tendon rupture     LEFT     Past Surgical History:   Procedure Laterality Date    REPAIR, TENDON, ACHILLES Left 7/30/2013    Performed by Torsten Prince MD at [facility] OR       Current Outpatient Medications:     sulfamethoxazole-trimethoprim 800-160mg (BACTRIM DS) 800-160 mg Tab, Take 1 tablet by mouth 2 (two) times daily. for 10 days, Disp: 28 tablet, Rfl: 0    Review of Systems   Constitutional: Negative for fever.   Eyes: Negative for visual disturbance.   Respiratory: Negative for shortness of breath.    Cardiovascular: Negative for chest pain.   Gastrointestinal: Negative for nausea.   Genitourinary: Positive for testicular pain. Negative for dysuria and hematuria.   Musculoskeletal: Negative for gait problem.   Skin: Negative for rash.   Neurological: Negative for seizures.   Psychiatric/Behavioral: Negative for confusion.       Objective:      Physical Exam   Constitutional: He is oriented to person, place, and time. He appears well-developed and well-nourished.   HENT:   Head: Normocephalic and atraumatic.   Eyes: Conjunctivae are normal.   Cardiovascular: Normal rate.   Pulmonary/Chest: Effort normal.   Abdominal: Hernia confirmed negative in the right inguinal area and confirmed negative in the left inguinal area.    Genitourinary: Testes normal and penis normal.   Genitourinary Comments: Thickened left cord.   Musculoskeletal: Normal range of motion. He exhibits no edema.   Neurological: He is alert and oriented to person, place, and time.   Skin: Skin is warm and dry. No rash noted.   Psychiatric: He has a normal mood and affect.   Vitals reviewed.      Assessment:       1. Scrotal pain    2. Testalgia    3. Lipoma of spermatic cord        Plan:       Scrotal pain  -     POCT URINE DIPSTICK WITHOUT MICROSCOPE  -     C. trachomatis/N. gonorrhoeae by AMP DNA  -     Urine culture  -     US Scrotum And Testicles; Future; Expected date: 10/17/2018  -     Trichomonas vaginalis, RNA, Qual, Urine; Future; Expected date: 10/17/2018    Testalgia    Lipoma of spermatic cord    Other orders  -     sulfamethoxazole-trimethoprim 800-160mg (BACTRIM DS) 800-160 mg Tab; Take 1 tablet by mouth 2 (two) times daily. for 10 days  Dispense: 28 tablet; Refill: 0
